# Patient Record
Sex: FEMALE | Race: ASIAN | NOT HISPANIC OR LATINO | Employment: FULL TIME | URBAN - METROPOLITAN AREA
[De-identification: names, ages, dates, MRNs, and addresses within clinical notes are randomized per-mention and may not be internally consistent; named-entity substitution may affect disease eponyms.]

---

## 2017-01-04 ENCOUNTER — TELEPHONE (OUTPATIENT)
Dept: ENDOCRINOLOGY | Facility: CLINIC | Age: 42
End: 2017-01-04

## 2017-01-04 DIAGNOSIS — E04.2 MULTINODULAR GOITER: Primary | ICD-10-CM

## 2017-01-04 NOTE — TELEPHONE ENCOUNTER
I called patient about FNA result. She is considering removing gland.  She went to [facility]. They also recommended biopsy.   Isthmus was benign but right mid nodule was inssuficient cells.   I recommended to repeat and she agrees.    Elvia Gomes MD  Staff Physician  Endocrinology and Metabolism  [facility]  License: [id number]  Pager: [phone]    Patient Name: ANTOINE HAN   MR#: [id number]   Specimen #: [id number]   Collected: 12/21/2016   Received: 12/22/2016   Reported: 12/23/2016 09:47   Ordering Phy(s): ELVIA GOMES     For improved result formatting, select 'View Enhanced Report Format'   under Linked Documents section.     SPECIMEN/STAIN PROCESS:   A: FNA-thyroid, right        Pap-Cyto x 3, Steiner's stain-cyto x 3   B: FNA-thyroid, isthmus        Pap-Cyto x 3, Steiner's stain-cyto x 3     ----------------------------------------------------------------     CYTOLOGIC INTERPRETATION:     A.  Thyroid, Right, Fine Needle Aspiration:   Unsatisfactory specimen.   Insufficient Follicular cells     [facility] Implied Risk of Malignancy and Recommended Clinical   Management:   Nondiagnostic or Unsatisfactory has a 1-4% risk of malignancy,   recommended management is repeat FNA with ultrasound guidance     Specimen Adequacy: Unsatisfactory for evaluation, due to:   ...scant follicular cells present.     B.  Thyroid, Isthmus, Fine Needle Aspiration:   Benign   Consistent with a benign nodule (includes adenomatoid nodule, colloid   nodule, etc.)     [facility] Implied Risk of Malignancy and Recommended Clinical   Management:   Benign has a 0-3% risk of malignancy, recommended management is clinical   follow-up     Specimen Adequacy: Satisfactory for evaluation.     I have personally reviewed all specimens and/or slides, including the   listed special stains, and used them with my medical judgment to   determine the final diagnosis.     Electronically signed out by:   Lencho Barclay M.D., [facility]      Processed and screened at [facility]     CLINICAL HISTORY:   The patient is a 41 year old female who presented for evaluation of   multiple thyroid nodules.     ,     GROSS:   A. FNA-thyroid, right:  Received are 3 fixed slides, processed for Pap   stain, and 3 air dried slides, processed for Steiner's stain. Material in   RPMI spun down, put in molecular panel tube and held for future studies.     B. FNA-thyroid, isthmus:  Received are 3 fixed slides, processed for Pap   stain, and 3 air dried slides, processed for Steiner's stain. Material in   RPMI spun down, put in molecular panel tube and held for future studies.     MICROSCOPIC:   Microscopic evaluation performed.     Yon Blount MD , Cytopathology Fellow, Justin Barclay MD.     CPT Codes:   A: 34475-TYKY, 61995-[id number]   B: 22917-UOBM, 25130-UTNKLET     TESTING LAB LOCATION:   [facility], [address]   [address] [phone]

## 2017-01-06 NOTE — TELEPHONE ENCOUNTER
Left message for patient to call procedural scheduling (number provided) for repeat FNA.     Encouraged to return clinic call directly with any questions.    Germania Tamayo LPN  Adult Endocrinology  [facility]

## 2017-01-27 NOTE — TELEPHONE ENCOUNTER
Patient states that she would prefer to wait until next year for next FNA. Writer offered to schedule follow up appointment, patient states that she will schedule at a later date.  Indy Miller LPN

## 2018-01-03 ENCOUNTER — OFFICE VISIT (OUTPATIENT)
Dept: PEDIATRICS | Facility: CLINIC | Age: 43
End: 2018-01-03
Payer: COMMERCIAL

## 2018-01-03 VITALS
HEIGHT: 63 IN | DIASTOLIC BLOOD PRESSURE: 68 MMHG | BODY MASS INDEX: 22.5 KG/M2 | WEIGHT: 127 LBS | OXYGEN SATURATION: 97 % | TEMPERATURE: 97.4 F | HEART RATE: 72 BPM | SYSTOLIC BLOOD PRESSURE: 98 MMHG

## 2018-01-03 DIAGNOSIS — Z13.220 LIPID SCREENING: ICD-10-CM

## 2018-01-03 DIAGNOSIS — Z00.00 ROUTINE HISTORY AND PHYSICAL EXAMINATION OF ADULT: Primary | ICD-10-CM

## 2018-01-03 DIAGNOSIS — R73.03 PREDIABETES: ICD-10-CM

## 2018-01-03 DIAGNOSIS — E04.2 MULTINODULAR GOITER: ICD-10-CM

## 2018-01-03 PROCEDURE — 99396 PREV VISIT EST AGE 40-64: CPT | Performed by: INTERNAL MEDICINE

## 2018-01-03 PROCEDURE — 99213 OFFICE O/P EST LOW 20 MIN: CPT | Mod: 25 | Performed by: INTERNAL MEDICINE

## 2018-01-03 ASSESSMENT — ANXIETY QUESTIONNAIRES
1. FEELING NERVOUS, ANXIOUS, OR ON EDGE: NOT AT ALL
2. NOT BEING ABLE TO STOP OR CONTROL WORRYING: NOT AT ALL
7. FEELING AFRAID AS IF SOMETHING AWFUL MIGHT HAPPEN: NOT AT ALL
6. BECOMING EASILY ANNOYED OR IRRITABLE: SEVERAL DAYS
GAD7 TOTAL SCORE: 1
5. BEING SO RESTLESS THAT IT IS HARD TO SIT STILL: NOT AT ALL
3. WORRYING TOO MUCH ABOUT DIFFERENT THINGS: NOT AT ALL

## 2018-01-03 ASSESSMENT — PATIENT HEALTH QUESTIONNAIRE - PHQ9
5. POOR APPETITE OR OVEREATING: NOT AT ALL
SUM OF ALL RESPONSES TO PHQ QUESTIONS 1-9: 2

## 2018-01-03 NOTE — PATIENT INSTRUCTIONS
Make appointment(s) for:   -- fasting lab appointment.  -- thyroid ultrasound                Preventive Health Recommendations  Female Ages 40 to 49    Yearly exam:     See your health care provider every year in order to  1. Review health changes.   2. Discuss preventive care.    3. Review your medicines if your doctor prescribed any.      Get a Pap test every three years (unless you have an abnormal result and your provider advises testing more often).      If you get Pap tests with HPV test, you only need to test every 5 years, unless you have an abnormal result. You do not need a Pap test if your uterus was removed (hysterectomy) and you have not had cancer.      You should be tested each year for STDs (sexually transmitted diseases), if you're at risk.       Ask your doctor if you should have a mammogram.      Have a colonoscopy (test for colon cancer) if someone in your family has had colon cancer or polyps before age 50.       Have a cholesterol test every 5 years.       Have a diabetes test (fasting glucose) after age 45. If you are at risk for diabetes, you should have this test every 3 years.    Shots: Get a flu shot each year. Get a tetanus shot every 10 years.     Nutrition:     Eat at least 5 servings of fruits and vegetables each day.    Eat whole-grain bread, whole-wheat pasta and brown rice instead of white grains and rice.    Talk to your provider about Calcium and Vitamin D.     Lifestyle    Exercise at least 150 minutes a week (an average of 30 minutes a day, 5 days a week). This will help you control your weight and prevent disease.    Limit alcohol to one drink per day.    No smoking.     Wear sunscreen to prevent skin cancer.    See your dentist every six months for an exam and cleaning.

## 2018-01-03 NOTE — NURSING NOTE
"Chief Complaint   Patient presents with     Physical       Initial BP 98/68  Pulse 72  Temp 97.4  F (36.3  C) (Temporal)  Ht 5' 3\" (1.6 m)  Wt 127 lb (57.6 kg)  LMP 12/02/2017  SpO2 97%  BMI 22.5 kg/m2 Estimated body mass index is 22.5 kg/(m^2) as calculated from the following:    Height as of this encounter: 5' 3\" (1.6 m).    Weight as of this encounter: 127 lb (57.6 kg).  Medication Reconciliation: complete    "

## 2018-01-03 NOTE — MR AVS SNAPSHOT
After Visit Summary   1/3/2018    Tete Cage    MRN: [id number]           Patient Information     Date Of Birth          1975        Visit Information        Provider Department      1/3/2018 11:50 AM Kannan De Jesus MD PhD [facility]        Today's Diagnoses     Routine history and physical examination of adult    -  1    Multinodular goiter        Lipid screening        Prediabetes          Care Instructions    Make appointment(s) for:   -- fasting lab appointment.  -- thyroid ultrasound                Preventive Health Recommendations  Female Ages 40 to 49    Yearly exam:     See your health care provider every year in order to  1. Review health changes.   2. Discuss preventive care.    3. Review your medicines if your doctor prescribed any.      Get a Pap test every three years (unless you have an abnormal result and your provider advises testing more often).      If you get Pap tests with HPV test, you only need to test every 5 years, unless you have an abnormal result. You do not need a Pap test if your uterus was removed (hysterectomy) and you have not had cancer.      You should be tested each year for STDs (sexually transmitted diseases), if you're at risk.       Ask your doctor if you should have a mammogram.      Have a colonoscopy (test for colon cancer) if someone in your family has had colon cancer or polyps before age 50.       Have a cholesterol test every 5 years.       Have a diabetes test (fasting glucose) after age 45. If you are at risk for diabetes, you should have this test every 3 years.    Shots: Get a flu shot each year. Get a tetanus shot every 10 years.     Nutrition:     Eat at least 5 servings of fruits and vegetables each day.    Eat whole-grain bread, whole-wheat pasta and brown rice instead of white grains and rice.    Talk to your provider about Calcium and Vitamin D.     Lifestyle    Exercise at least 150 minutes a week (an average of 30 minutes a day, 5  days a week). This will help you control your weight and prevent disease.    Limit alcohol to one drink per day.    No smoking.     Wear sunscreen to prevent skin cancer.    See your dentist every six months for an exam and cleaning.          Follow-ups after your visit        Future tests that were ordered for you today     Open Future Orders        Priority Expected Expires Ordered    TSH Routine  1/31/2018 1/3/2018    Glucose Routine  1/31/2018 1/3/2018    Lipid panel reflex to direct LDL Fasting Routine  1/31/2018 1/3/2018    US Thyroid Routine  1/3/2019 1/3/2018            Who to contact     If you have questions or need follow up information about today's clinic visit or your schedule please contact [facility] directly at [phone].  Normal or non-critical lab and imaging results will be communicated to you by [facility] [employer] [facility], letter or phone within 4 business days after the clinic has received the results. If you do not hear from us within 7 days, please contact the clinic through [facility] [employer] or phone. If you have a critical or abnormal lab result, we will notify you by phone as soon as possible.  Submit refill requests through [employer] or call your pharmacy and they will forward the refill request to us. Please allow 3 business days for your refill to be completed.          Additional Information About Your Visit        [facility] [employer] Information     [employer] gives you secure access to your electronic health record. If you see a primary care provider, you can also send messages to your care team and make appointments. If you have questions, please call your primary care clinic.  If you do not have a primary care provider, please call [phone] and they will assist you.      [employer] is an electronic gateway that provides easy, online access to your medical records. With [employer], you can request a clinic appointment, read your test results, renew a prescription or communicate with your care team.     To  "access your existing account, please contact your [facility] or call [phone] for assistance.        Care EveryWhere ID     This is your Care EveryWhere ID. This could be used by other organizations to access your [place] medical records  [id number]        Your Vitals Were     Pulse Temperature Height Last Period Pulse Oximetry BMI (Body Mass Index)    72 97.4  F (36.3  C) (Temporal) 5' 3\" (1.6 m) 12/02/2017 97% 22.5 kg/m2       Blood Pressure from Last 3 Encounters:   01/03/18 98/68   12/22/16 (!) 88/56   12/14/16 107/68    Weight from Last 3 Encounters:   01/03/18 127 lb (57.6 kg)   12/22/16 134 lb (60.8 kg)   12/14/16 135 lb 8 oz (61.5 kg)               Primary Care Provider Office Phone # Fax #    Kannan De Jesus MD [facility] [phone]       [address] [facility] [address]        Equal Access to Services     [facility]: Hadii aad ku hadasho Soomaali, waaxda luqadaha, qaybta kaalmada adeegyada, waxay ric haymingo palmer . So [facility] [phone].    ATENCIÓN: Si habla español, tiene a musa disposición servicios gratuitos de asistencia lingüística. Llame al [phone].    We comply with applicable federal civil rights laws and [place] laws. We do not discriminate on the basis of race, color, national origin, age, disability, sex, sexual orientation, or gender identity.            Thank you!     Thank you for choosing [facility]  for your care. Our goal is always to provide you with excellent care. Hearing back from our patients is one way we can continue to improve our services. Please take a few minutes to complete the written survey that you may receive in the mail after your visit with us. Thank you!             Your Updated Medication List - Protect others around you: Learn how to safely use, store and throw away your medicines at www.disposemymeds.org.          This list is accurate as of: 1/3/18 12:53 PM.  Always use your " most recent med list.                   Brand Name Dispense Instructions for use Diagnosis    clindamycin-benzoyl Per (Refr) 1.2-5 % Gel    DUAC    45 g    Apply to face, particularly over jaw line, daily    Acne vulgaris       MULTIVITAMIN PO      Take one tablet daily    Multinodular goiter, History of gestational diabetes       vitamin D 2000 UNITS Caps      Take 4,000 Units by mouth daily as needed

## 2018-01-03 NOTE — PROGRESS NOTES
SUBJECTIVE:   CC: Tete Cage is an 42 year old woman who presents for preventive health visit.     Healthy Habits:    Do you get at least three servings of calcium containing foods daily (dairy, green leafy vegetables, etc.)? yes    Amount of exercise or daily activities, outside of work: 1 day(s) per week    Problems taking medications regularly No    Medication side effects: No    Have you had an eye exam in the past two years? yes    Do you see a dentist twice per year? yes    Do you have sleep apnea, excessive snoring or daytime drowsiness?no  =================  History of multinodular goiter. Had biopsy a year ago, benign. She is wondering what she needs to do next. Previously followed by endocrine but has not follow up since the biopsy. She feel the goiter may be getting larger. Has a discomfort on the left side.     -------------------------------------    Today's PHQ-2 Score: PHQ-2 ( 1999 Pfizer) 9/20/2012 12/16/2011   Q1: Little interest or pleasure in doing things 0 0   Q2: Feeling down, depressed or hopeless 1 0   PHQ-2 Score 1 0       Abuse: Current or Past(Physical, Sexual or Emotional)- No  Do you feel safe in your environment - Yes    Social History   Substance Use Topics     Smoking status: Never Smoker     Smokeless tobacco: Never Used     Alcohol use No     If you drink alcohol do you typically have >3 drinks per day or >7 drinks per week? No                     Reviewed orders with patient.  Reviewed health maintenance and updated orders accordingly - Yes  Labs reviewed in EPIC    Patient under age 50, mutual decision reflected in health maintenance.        Pertinent mammograms are reviewed under the imaging tab.  History of abnormal Pap smear: NO - age 30- 65 PAP every 3 years recommended    Reviewed and updated as needed this visit by clinical staffTobacco  Allergies  Meds  Med Hx  Surg Hx  Fam Hx  Soc Hx        Reviewed and updated as needed this visit by Provider              NOHEMY:  C:  "NEGATIVE for fever, chills, change in weight  I: NEGATIVE for worrisome rashes, moles or lesions  E: NEGATIVE for vision changes or irritation  ENT: NEGATIVE for ear, mouth and throat problems  R: NEGATIVE for significant cough or SOB  B: NEGATIVE for masses, tenderness or discharge  CV: NEGATIVE for chest pain, palpitations or peripheral edema  GI: NEGATIVE for nausea, abdominal pain, heartburn, or change in bowel habits  : NEGATIVE for unusual urinary or vaginal symptoms. Periods are regular.  M: NEGATIVE for significant arthralgias or myalgia  N: NEGATIVE for weakness, dizziness or paresthesias  P: NEGATIVE for changes in mood or affect    OBJECTIVE:   BP 98/68  Pulse 72  Temp 97.4  F (36.3  C) (Temporal)  Ht 5' 3\" (1.6 m)  Wt 127 lb (57.6 kg)  LMP 12/02/2017  SpO2 97%  BMI 22.5 kg/m2  EXAM:  GENERAL: healthy, alert and no distress  EYES: Eyes grossly normal to inspection, PERRL and conjunctivae and sclerae normal  HENT: ear canals and TM's normal, nose and mouth without ulcers or lesions  NECK: no adenopathy, no asymmetry, masses, or scars, Positive for  Enlarged thyroid gland, mildly tender to palpation on the left lobe.   RESP: lungs clear to auscultation - no rales, rhonchi or wheezes  BREAST: normal without masses, tenderness or nipple discharge and no palpable axillary masses or adenopathy  CV: regular rate and rhythm, normal S1 S2, no S3 or S4, no murmur, click or rub, no peripheral edema and peripheral pulses strong  ABDOMEN: soft, nontender, no hepatosplenomegaly, no masses and bowel sounds normal  MS: no gross musculoskeletal defects noted, no edema  SKIN: no suspicious lesions or rashes  NEURO: Normal strength and tone, mentation intact and speech normal  PSYCH: mentation appears normal, affect normal/bright    ASSESSMENT/PLAN:       ICD-10-CM    1. Routine history and physical examination of adult Z00.00    2. Multinodular goiter E04.2 US Thyroid     Lipid panel reflex to direct LDL Fasting " "  3. Lipid screening Z13.220 TSH   4. Prediabetes R73.03 Glucose     -- will repeat US check TSH. From the prior report, she has multiple goiters and has growth and new nodules. She may be a candidate for surgical resection as a treatment. This was mentioned to her in the past but she has not followed through. But she seems ready now if the nodules are growing.     COUNSELING:   Reviewed preventive health counseling, as reflected in patient instructions         reports that she has never smoked. She has never used smokeless tobacco.    Estimated body mass index is 22.5 kg/(m^2) as calculated from the following:    Height as of this encounter: 5' 3\" (1.6 m).    Weight as of this encounter: 127 lb (57.6 kg).         Counseling Resources:  ATP IV Guidelines  Pooled Cohorts Equation Calculator  Breast Cancer Risk Calculator  FRAX Risk Assessment  ICSI Preventive Guidelines  Dietary Guidelines for Americans, 2010  USDA's MyPlate  ASA Prophylaxis  Lung CA Screening    Kannan De Jesus MD PhD  [facility]  "

## 2018-01-04 DIAGNOSIS — E04.2 MULTINODULAR GOITER: ICD-10-CM

## 2018-01-04 DIAGNOSIS — R73.03 PREDIABETES: ICD-10-CM

## 2018-01-04 DIAGNOSIS — Z13.220 LIPID SCREENING: ICD-10-CM

## 2018-01-04 LAB
CHOLEST SERPL-MCNC: 179 MG/DL
GLUCOSE SERPL-MCNC: 90 MG/DL (ref 70–99)
HDLC SERPL-MCNC: 71 MG/DL
LDLC SERPL CALC-MCNC: 97 MG/DL
NONHDLC SERPL-MCNC: 108 MG/DL
TRIGL SERPL-MCNC: 55 MG/DL
TSH SERPL DL<=0.005 MIU/L-ACNC: 0.59 MU/L (ref 0.4–4)

## 2018-01-04 PROCEDURE — 36415 COLL VENOUS BLD VENIPUNCTURE: CPT | Performed by: INTERNAL MEDICINE

## 2018-01-04 PROCEDURE — 80061 LIPID PANEL: CPT | Performed by: INTERNAL MEDICINE

## 2018-01-04 PROCEDURE — 84443 ASSAY THYROID STIM HORMONE: CPT | Performed by: INTERNAL MEDICINE

## 2018-01-04 PROCEDURE — 82947 ASSAY GLUCOSE BLOOD QUANT: CPT | Performed by: INTERNAL MEDICINE

## 2018-01-04 ASSESSMENT — ANXIETY QUESTIONNAIRES: GAD7 TOTAL SCORE: 1

## 2018-01-04 NOTE — PROGRESS NOTES
Dear Tete,   Here are your recent results which are within the expected range.  Please continue with your current plan of care.     Please call or Mychart to our office if you have further questions.     Kannan De Jesus MD-PhD

## 2018-01-08 ENCOUNTER — RADIANT APPOINTMENT (OUTPATIENT)
Dept: ULTRASOUND IMAGING | Facility: CLINIC | Age: 43
End: 2018-01-08
Attending: INTERNAL MEDICINE
Payer: COMMERCIAL

## 2018-01-08 DIAGNOSIS — E04.2 MULTINODULAR GOITER: ICD-10-CM

## 2018-01-08 PROCEDURE — 76536 US EXAM OF HEAD AND NECK: CPT | Performed by: RADIOLOGY

## 2018-01-21 NOTE — PROGRESS NOTES
Dear Tete,   Here are your recent results.   -- the nodules are not significantly changed by the thyroid gland itself is getting larger (both the right and the left lobes).   -- if you'd like to get a surgical opinion regarding removing the thyroid gland, please let me know.   -- alternative would be discuss with your endocrinologist about taking thyroid mediation to suppress growth.     Please call or Mychart to our office if you have further questions.     Kannan De Jesus MD-PhD

## 2018-11-26 ENCOUNTER — OFFICE VISIT (OUTPATIENT)
Dept: ENDOCRINOLOGY | Facility: CLINIC | Age: 43
End: 2018-11-26
Payer: COMMERCIAL

## 2018-11-26 VITALS
HEART RATE: 80 BPM | SYSTOLIC BLOOD PRESSURE: 105 MMHG | HEIGHT: 63 IN | DIASTOLIC BLOOD PRESSURE: 67 MMHG | BODY MASS INDEX: 22.95 KG/M2 | WEIGHT: 129.5 LBS

## 2018-11-26 DIAGNOSIS — E04.2 MULTINODULAR GOITER: ICD-10-CM

## 2018-11-26 DIAGNOSIS — Z86.32 HISTORY OF GESTATIONAL DIABETES: Primary | ICD-10-CM

## 2018-11-26 ASSESSMENT — PAIN SCALES - GENERAL: PAINLEVEL: NO PAIN (0)

## 2018-11-26 NOTE — PROGRESS NOTES
Endocrinology Consult Note    Attending ASSESSMENT/PLAN:   1. Goiter with multiple nodules in young woman originally from [place].   Recommend CT neck and chest - no contrast  Labs to include TSH    Addendum: CT images show goiter without substernal extension or airway compression. 2 mm lung nodule.     History of Gestational Diabetes mellitus- labs to include HgbA1c  Addendum: results show pre-diabetes     Lucia Landin MD      Cc/ HISTORY OF PRESENT ILLNESS  43 yr old woman presents for follow up of goiter and history of gestational hyperthyroxinemia and GDM. I have seen her in the past, last in 2014.  She saw Dr Gomes 12/16.  Following that appt she had US and also FNAB of 2 thyroid nodules.  I have reviewed all the images and reports from 2016 as well as US images from 1/18.      Goiter was described as 3-4 times in 2008.  I lst met her in 2012 . At that time I described the thyroid as 3-4 times normal.  In 2013 I noted she had recurrent gestational hyperthyroxinemia followed by hypothyroxinemia in the 3rd trimester.  She also had GDM.      1/8/18 (compared with 12/13/16 and 1/13/14 thyroid US:  Right lobe 3.8 x 3.1 x ?  (was 2.9 x 2.1 x ?  2008_  Left lobe 4.1 x 2.8 x ?  (was 2.6 x 2.1 x ? 2008)  Isthmus 1 cm (was 0.6 cm 2008)  Right # 1  Posterior 1.7 x 1.2 x 2.5  - at least 50% centrally cystic (was 1.7 x 1.3 x 2.3 2016;  1.9 x 1.1 x  2.2 ; 1.7 x 1.1 x  2.6 2016; 1.6 x 1.1 x 2.1 1/14  FNAB 12/16 insufficient  Right # 2 posterior medial 1.2 x 1.2 x 1.2 isoechoic (was 1.2 x 1.1 x 1.2   Right # 3 inf 0.7 x 0.6 x 0.7  (was 0.7 x 0.4 x   Isthmus cyst 1.6 x 0.9 x 2.2 cm FNAB 12/21/16 (was 1.7 x 0.8 x 2 cm 2016;  1.3 x 0.8 x 1.2 1/14) FNAB 2016 benign  Left # 4 0.5 x 0.5 x 0.4 cm   Left # 5  0.9 x 0.5 x 1 cm   Left # 6  1.8 x 1.1 x 1.9 mixed   Left # 7  0.8 x 0.9 x 0.9 cm       REVIEW OF SYSTEMS  Feels pain  Left thyroid bed region -   If she turns her head to the left she feels it more . This has been > 1  "year  She feels it a little with head extension.    Voice is OK  Swallow is OK   Cardiac: negative  Respiratory: negative  GI: negative    Past Medical History  Past Medical History:   Diagnosis Date     Acne      Arthritis     pt reports dx as child but not current     GDM (gestational diabetes mellitus) 2013     Goiter 2008     Kidney disease     pt reports as child but not current     Papanicolaou smear of cervix with low grade squamous intraepithelial lesion (LGSIL)     2 years ago in [place], has one normal PAP since     Thyroid nodules     reports 3 nodules detected in 2008, declined surgery     Gestational hyperthyroxinemia  G3, T1, P0, A1, L1    Medications  Current Outpatient Prescriptions   Medication Sig Dispense Refill     Cholecalciferol (VITAMIN D) 2000 UNITS CAPS Take 4,000 Units by mouth daily as needed       Multiple Vitamins-Minerals (MULTIVITAMIN OR) Take one tablet daily       clindamycin-benzoyl Per, Refr, (DUAC) 1.2-5 % GEL Apply to face, particularly over jaw line, daily (Patient not taking: Reported on 11/26/2018) 45 g 11       Allergies  Allergies   Allergen Reactions     Nkda [No Known Drug Allergies]        Family History  Family History   Problem Relation Age of Onset     Heart Disease Father      MI     Diabetes Father      Heart Disease Mother      MI     Thyroid Disease Mother      transient     Depression Maternal Grandfather      Cancer No family hx of      no skin cancer     No gestational thyroid problems in mother or sisters    Social History  Social History   Substance Use Topics     Smoking status: Never Smoker     Smokeless tobacco: Never Used     Alcohol use No       Physical Exam  /67  Pulse 80  Ht 1.6 m (5' 3\")  Wt 58.7 kg (129 lb 8 oz)  BMI 22.94 kg/m2  Body mass index is 22.94 kg/(m^2).  GENERAL :   young woman In no apparent distress.  SKIN: Normal color, temperature  EYES: PER, No scleral icterus,  No proptosis, conjunctival redness, stare, retraction  NECK: " visible goiter , overall size approx 4 times.  Left appears slightly largre than right.  I have performed real time neck and thyrodi US.  There are no abnormal cervical LNs.  I was unable to definitively get below the left inferior border of the thyroid - I can't exclude substernal extension.  LUNGS: clear bilaterally  CARDIAC: RRR S1S2  ABDOMEN: rounded contour   NEURO: awake, alert, responds appropriately to questions.  Moves all extremities; Gait normal.   No tremor      DATA REVIEW    Copath Report 12/21/2016  1:09 PM 88      Patient Name: ANTOINE HAN   MR#: [id number]   Specimen #: [id number]   Collected: 12/21/2016   Received: 12/22/2016   Reported: 12/23/2016 09:47   Ordering Phy(s): ELVIA THIBODEAUX     For improved result formatting, select 'View Enhanced Report Format'   under Linked Documents section.     SPECIMEN/STAIN PROCESS:   A: FNA-thyroid, right        Pap-Cyto x 3, Steiner's stain-cyto x 3   B: FNA-thyroid, isthmus        Pap-Cyto x 3, Steiner's stain-cyto x 3     ----------------------------------------------------------------     CYTOLOGIC INTERPRETATION:     A.  Thyroid, Right, Fine Needle Aspiration:   Unsatisfactory specimen.   Insufficient Follicular cells     The [place] Implied Risk of Malignancy and Recommended Clinical   Management:   Nondiagnostic or Unsatisfactory has a 1-4% risk of malignancy,   recommended management is repeat FNA with ultrasound guidance     Specimen Adequacy: Unsatisfactory for evaluation, due to:   ...scant follicular cells present.     B.  Thyroid, Isthmus, Fine Needle Aspiration:   Benign   Consistent with a benign nodule (includes adenomatoid nodule, colloid   nodule, etc.)     The [place] Implied Risk of Malignancy and Recommended Clinical   Management:   Benign has a 0-3% risk of malignancy, recommended management is clinical   follow-up     Specimen Adequacy: Satisfactory for evaluation.     I have personally reviewed all specimens and/or slides, including the    listed special stains, and used them with my medical judgment to   determine the final diagnosis.     Electronically signed out by:   Lencho Barclay M.D., [facility]     Processed and screened at [facility]     CLINICAL HISTORY:   The patient is a 41 year old female who presented for evaluation of   multiple thyroid nodules.     ,     GROSS:   A. FNA-thyroid, right:  Received are 3 fixed slides, processed for Pap   stain, and 3 air dried slides, processed for Steiner's stain. Material in   RPMI spun down, put in molecular panel tube and held for future studies.     B. FNA-thyroid, isthmus:  Received are 3 fixed slides, processed for Pap   stain, and 3 air dried slides, processed for Steiner's stain. Material in   RPMI spun down, put in molecular panel tube and held for future studies.     MICROSCOPIC:   Microscopic evaluation performed.     Yon Blount MD , Cytopathology Fellow, Justin Barclay MD.     CPT Codes:   A: 05624-LKED, 64777-BTCVSGD   B: 21488-ICJF, 86460-AAQLNZY     TESTING LAB LOCATION:   [facility], [address]   [address]   [phone]     COLLECTION SITE:   Client:  [facility]   Location:  [facility] (B)        EXAMINATION: US THYROID, 1/8/2018 11:39 AM      COMPARISON: 12/21/2016, 12/30/2016, 10/16/2012     HISTORY: Multinodular goiter. Thyroid nodule biopsy of a nodule in  isthmus nodule on 12/21/2016 yielded benign results.     Technique: Grayscale and color ultrasound imaging of the thyroid was  performed.     Findings:    Thyroid parenchyma: Heterogeneous  The right lobe of the thyroid measures: 8.5 x 3.1 x 3.8 cm, previously  6.5 x 2.9 x 3.3 cm   The thyroid isthmus measures: 1 cm, previously 0.7 cm   The left lobe of the thyroid measures: 7.5 x 2.8 x 4.1 cm, previously  6.7 x 3 x 3.4 cm      Right  lobe:  Nodule 1: Previously biopsied.  Nodule measurement: 2.5 x 1.7 x 1.2 cm , previously 2.6 x 1.7 x 1.1 cm  Echogenicity: Isoechoic  Consistency: Cystic and solid  Calcifications: no  Hypervascular: no  Interval growth (>20%): no     Nodule 2:  Nodule measurement: 1.2 x 1.2 x 1.2 cm , previously 1.2 x 1.2 x 1.1 cm  Echogenicity: Isoechoic  Consistency: solid  Calcifications: no  Hypervascular: no  Interval growth (>20%): no     Nodule 3:  Nodule measurement: 0.7 x 0.7 x 0.6 cm , previously 0.8 x 0.7 x 0.4 cm  Echogenicity: Isoechoic  Consistency: solid  Calcifications: no  Hypervascular: no  Interval growth (>20%): no     Isthmus:  Nodule 8: Previously biopsied  Nodule measurement: 2.2 x 1.6 x 0.9 cm , previously 2 x 1.3 x 0.7 cm  Echogenicity: Anechoic  Consistency: cystic  Calcifications: no  Hypervascular: no  Interval growth (>20%): no     Left Lobe:   Nodule 4:  Nodule measurement: 0.4 x 0.5 x 0.5 cm , previously 0.7 x 0.7 x 0.5 cm  Echogenicity: Isoechoic  Consistency: Cystic and solid  Calcifications: no  Hypervascular: no  Interval growth (>20%): no     Nodule 5:  Nodule measurement: 1 x 0.9 x 0.5 cm , previously 1 x 0.9 x 0.4 cm  Echogenicity: Isoechoic  Consistency: solid  Calcifications: no  Hypervascular: no  Interval growth (>20%): no     Nodule 6:  Nodule measurement: 1.9 x 1.8 x 1.1 cm , previously 1.8 x 1.7 x 1 cm  Echogenicity: Isoechoic  Consistency: Cystic and solid  Calcifications: no  Hypervascular: no  Interval growth (>20%): no     Nodule 7:  Nodule measurement: 0.9 x 0.8 x 0.9 cm , previously 0.9 x 0.9 x 0.8 cm  Echogenicity: Hypoechoic  Consistency: solid  Calcifications: no  Hypervascular: no  Interval growth (>20%): no         Impression:  Heterogeneous, enlarging thyroid glands. Multinodular nodules, less  defined as before.     JOCELYN PEPE MD    Results for ANTOINE HAN (MRN [id number]) as of 1/10/2019 20:14   Ref. Range 1/10/2019 11:46   Hemoglobin A1C Latest Ref Range: 0 - 5.6 % 5.8 (H)    TSH Latest Ref Range: 0.40 - 4.00 mU/L 0.76     EXAMINATION: CT CHEST W/O CONTRAST, 1/10/2019 11:54 AM     TECHNIQUE:  Helical CT images from the thoracic inlet through the  upper abdomen were obtained without intravenous contrast.  Images are  displayed at 1 and 5 mm intervals. Images reviewed in lung, soft  tissue, and bone windows.     Radiation Dose (DLP): 342 mGy*cm     COMPARISON: None     HISTORY: Multinodular goiter; History of gestational diabetes     FINDINGS:  Enlarged, lobular thyroid. In maximum AP diameter, left lobe measures  up to 5.1 cm and right lobe measures up to 5.1 cm.     The central tracheobronchial tree is patent. Minimal bibasilar,  dependent, subpleural reticular/ground glass attenuation, consistent  with atelectasis. Biapical scarring. No focal consolidation. No  pneumothorax or pleural effusion. Left upper lobe solid pulmonary  nodule measuring 2 mm (series 5 image 56).     The heart size is normal. No pericardial effusion. Normal caliber and  configuration of the thoracic great vessels. No thoracic adenopathy.     Limited views of the abdomen reveal no acute pathology. No suspicious  bony or soft tissue lesions.                                                                      IMPRESSION:      1. Enlarged, lobular thyroid gland, better characterized on comparison  CT neck. Please see same-day report for additional description  details.  2. 2 mm solid pulmonary nodule in the left upper lobe, statistically  benign. No additional follow up indicated at this time.     I have personally reviewed the examination and initial interpretation  and I agree with the findings.     JESSICA JONES MD    CT SOFT TISSUE NECK W/O CONTRAST 1/10/2019 11:53 AM     History:  evalute for substernal goiter.  NO CONTRAST PLEASE.;  Multinodular goiter; History of gestational diabetes  ICD-10: Multinodular goiter; History of gestational diabetes      Comparison:  Thyroid ultrasound 1 2018      Technique:  Thin section helical CT images were obtained from the skull  base down to the level of the aortic arch.  Axial, coronal and  sagittal reformations were performed with 2-3 mm slice thickness  reconstruction. Images were reviewed in soft tissue, lung and bone  windows.     Findings:   Evaluation of the mucosal space demonstrates no evident abnormality in  the nasopharynx, oropharynx, hypopharynx or the glottis. The tongue  base appears normal. The major salivary glands appear unremarkable.  The thyroid gland appears moderately enlarged overall and relatively  symmetric and bilateral although the right lobe that is also enlarged  inferiorly has further extension into the superior mediastinum with  only minimal airway compression. The bilaterally moderately enlarged  thyroid gland overall somewhat lumpy and nodular with hypoattenuating  areas more inferiorly on both sides, right greater than left. No overt  calcifications.     There is no evident cervical lymphadenopathy, where there are tiny,  subcentimeter nodes in levels 2 bilaterally that are not enlarged by  size criteria. The fascial spaces in the neck are intact bilaterally.  The major vascular structures in the neck appear unremarkable.     Evaluation of the osseous structures demonstrate no worrisome lytic or  sclerotic lesion. No overt spinal canal or neuroforaminal stenosis.  The visualized paranasal sinuses are clear. The mastoid air cells are  clear.      The visualized lung apices are clear.                                                                      Impression: Moderately enlarged multinodular thyroid goiter,  relatively symmetric, with very minimal tracheal airway narrowing.     I have personally reviewed the examination and initial interpretation  and I agree with the findings.     ARGENIS CAMP MD

## 2018-11-26 NOTE — LETTER
11/26/2018       RE: Tete Cage  [address]  [facility] [address]     Dear Colleague,    Thank you for referring your patient, Tete Cage, to the [facility] ENDOCRINOLOGY at [facility]. Please see a copy of my visit note below.    Endocrinology Consult Note    Attending ASSESSMENT/PLAN:   1. Goiter with multiple nodules in young woman originally from [place].   Recommend CT neck and chest - no contrast  Labs to include TSH    History of Gestational Diabetes mellitus- labs to include HgbA1c    Lucia Landin MD      Cc/ HISTORY OF PRESENT ILLNESS  43 yr old woman presents for follow up of goiter and history of gestational hyperthyroxinemia and GDM. I have seen her in the past, last in 2014.  She saw Dr Gomes 12/16.  Following that appt she had US and also FNAB of 2 thyroid nodules.  I have reviewed all the images and reports from 2016 as well as US images from 1/18.      Goiter was described as 3-4 times in 2008.  I lst met her in 2012 . At that time I described the thyroid as 3-4 times normal.  In 2013 I noted she had recurrent gestational hyperthyroxinemia followed by hypothyroxinemia in the 3rd trimester.  She also had GDM.      1/8/18 (compared with 12/13/16 and 1/13/14 thyroid US:  Right lobe 3.8 x 3.1 x ?  (was 2.9 x 2.1 x ?  2008_  Left lobe 4.1 x 2.8 x ?  (was 2.6 x 2.1 x ? 2008)  Isthmus 1 cm (was 0.6 cm 2008)  Right # 1  Posterior 1.7 x 1.2 x 2.5  - at least 50% centrally cystic (was 1.7 x 1.3 x 2.3 2016;  1.9 x 1.1 x  2.2 ; 1.7 x 1.1 x  2.6 2016; 1.6 x 1.1 x 2.1 1/14  FNAB 12/16 insufficient  Right # 2 posterior medial 1.2 x 1.2 x 1.2 isoechoic (was 1.2 x 1.1 x 1.2   Right # 3 inf 0.7 x 0.6 x 0.7  (was 0.7 x 0.4 x   Isthmus cyst 1.6 x 0.9 x 2.2 cm FNAB 12/21/16 (was 1.7 x 0.8 x 2 cm 2016;  1.3 x 0.8 x 1.2 1/14) FNAB 2016 benign  Left # 4 0.5 x 0.5 x 0.4 cm   Left # 5  0.9 x 0.5 x 1 cm   Left # 6  1.8 x 1.1 x 1.9 mixed   Left # 7  0.8 x 0.9 x 0.9 cm       REVIEW OF  "SYSTEMS  Feels pain  Left thyroid bed region -   If she turns her head to the left she feels it more . This has been > 1 year  She feels it a little with head extension.    Voice is OK  Swallow is OK   Cardiac: negative  Respiratory: negative  GI: negative    Past Medical History  Past Medical History:   Diagnosis Date     Acne      Arthritis     pt reports dx as child but not current     GDM (gestational diabetes mellitus) 2013     Goiter 2008     Kidney disease     pt reports as child but not current     Papanicolaou smear of cervix with low grade squamous intraepithelial lesion (LGSIL)     2 years ago in [place], has one normal PAP since     Thyroid nodules     reports 3 nodules detected in 2008, declined surgery     Gestational hyperthyroxinemia  G3, T1, P0, A1, L1    Medications  Current Outpatient Prescriptions   Medication Sig Dispense Refill     Cholecalciferol (VITAMIN D) 2000 UNITS CAPS Take 4,000 Units by mouth daily as needed       Multiple Vitamins-Minerals (MULTIVITAMIN OR) Take one tablet daily       clindamycin-benzoyl Per, Refr, (DUAC) 1.2-5 % GEL Apply to face, particularly over jaw line, daily (Patient not taking: Reported on 11/26/2018) 45 g 11       Allergies  Allergies   Allergen Reactions     Nkda [No Known Drug Allergies]        Family History  Family History   Problem Relation Age of Onset     Heart Disease Father      MI     Diabetes Father      Heart Disease Mother      MI     Thyroid Disease Mother      transient     Depression Maternal Grandfather      Cancer No family hx of      no skin cancer     No gestational thyroid problems in mother or sisters    Social History  Social History   Substance Use Topics     Smoking status: Never Smoker     Smokeless tobacco: Never Used     Alcohol use No       Physical Exam  /67  Pulse 80  Ht 1.6 m (5' 3\")  Wt 58.7 kg (129 lb 8 oz)  BMI 22.94 kg/m2  Body mass index is 22.94 kg/(m^2).  GENERAL :   young woman In no apparent distress.  SKIN: " Normal color, temperature  EYES: PER, No scleral icterus,  No proptosis, conjunctival redness, stare, retraction  NECK: visible goiter , overall size approx 4 times.  Left appears slightly largre than right.  I have performed real time neck and thyrodi US.  There are no abnormal cervical LNs.  I was unable to definitively get below the left inferior border of the thyroid - I can't exclude substernal extension.  LUNGS: clear bilaterally  CARDIAC: RRR S1S2  ABDOMEN: rounded contour   NEURO: awake, alert, responds appropriately to questions.  Moves all extremities; Gait normal.   No tremor      DATA REVIEW    Copath Report 12/21/2016  1:09 PM 88      Patient Name: ANTOINE HAN   MR#: [id number]   Specimen #: [id number]   Collected: 12/21/2016   Received: 12/22/2016   Reported: 12/23/2016 09:47   Ordering Phy(s): ELVIA THIBODEAUX     For improved result formatting, select 'View Enhanced Report Format'   under Linked Documents section.     SPECIMEN/STAIN PROCESS:   A: FNA-thyroid, right        Pap-Cyto x 3, Steiner's stain-cyto x 3   B: FNA-thyroid, isthmus        Pap-Cyto x 3, Steiner's stain-cyto x 3     ----------------------------------------------------------------     CYTOLOGIC INTERPRETATION:     A.  Thyroid, Right, Fine Needle Aspiration:   Unsatisfactory specimen.   Insufficient Follicular cells     The [place] Implied Risk of Malignancy and Recommended Clinical   Management:   Nondiagnostic or Unsatisfactory has a 1-4% risk of malignancy,   recommended management is repeat FNA with ultrasound guidance     Specimen Adequacy: Unsatisfactory for evaluation, due to:   ...scant follicular cells present.     B.  Thyroid, Isthmus, Fine Needle Aspiration:   Benign   Consistent with a benign nodule (includes adenomatoid nodule, colloid   nodule, etc.)     The [place] Implied Risk of Malignancy and Recommended Clinical   Management:   Benign has a 0-3% risk of malignancy, recommended management is clinical   follow-up     Specimen  Adequacy: Satisfactory for evaluation.     I have personally reviewed all specimens and/or slides, including the   listed special stains, and used them with my medical judgment to   determine the final diagnosis.     Electronically signed out by:   Lencho Barclay M.D., [facility]     Processed and screened at [facility]     CLINICAL HISTORY:   The patient is a 41 year old female who presented for evaluation of   multiple thyroid nodules.     ,     GROSS:   A. FNA-thyroid, right:  Received are 3 fixed slides, processed for Pap   stain, and 3 air dried slides, processed for Steiner's stain. Material in   RPMI spun down, put in molecular panel tube and held for future studies.     B. FNA-thyroid, isthmus:  Received are 3 fixed slides, processed for Pap   stain, and 3 air dried slides, processed for Steiner's stain. Material in   RPMI spun down, put in molecular panel tube and held for future studies.     MICROSCOPIC:   Microscopic evaluation performed.     Yon Blount MD , Cytopathology Fellow, Justin Barclay MD.     CPT Codes:   A: 67934-TQJY, 91419-[id number]   B: 69113-FGIC, 97449-ESNRYRY     TESTING LAB LOCATION:   [facility], [address]   [id number] [phone]     COLLECTION SITE:   Client:  [facility]   [place]  [facility] [place]        EXAMINATION: US THYROID, 1/8/2018 11:39 AM      COMPARISON: 12/21/2016, 12/30/2016, 10/16/2012     HISTORY: Multinodular goiter. Thyroid nodule biopsy of a nodule in  isthmus nodule on 12/21/2016 yielded benign results.     Technique: Grayscale and color ultrasound imaging of the thyroid was  performed.     Findings:    Thyroid parenchyma: Heterogeneous  The right lobe of the thyroid measures: 8.5 x 3.1 x 3.8 cm, previously  6.5 x 2.9 x 3.3 cm   The thyroid isthmus measures: 1 cm, previously 0.7 cm    The left lobe of the thyroid measures: 7.5 x 2.8 x 4.1 cm, previously  6.7 x 3 x 3.4 cm      Right lobe:  Nodule 1: Previously biopsied.  Nodule measurement: 2.5 x 1.7 x 1.2 cm , previously 2.6 x 1.7 x 1.1 cm  Echogenicity: Isoechoic  Consistency: Cystic and solid  Calcifications: no  Hypervascular: no  Interval growth (>20%): no     Nodule 2:  Nodule measurement: 1.2 x 1.2 x 1.2 cm , previously 1.2 x 1.2 x 1.1 cm  Echogenicity: Isoechoic  Consistency: solid  Calcifications: no  Hypervascular: no  Interval growth (>20%): no     Nodule 3:  Nodule measurement: 0.7 x 0.7 x 0.6 cm , previously 0.8 x 0.7 x 0.4 cm  Echogenicity: Isoechoic  Consistency: solid  Calcifications: no  Hypervascular: no  Interval growth (>20%): no     Isthmus:  Nodule 8: Previously biopsied  Nodule measurement: 2.2 x 1.6 x 0.9 cm , previously 2 x 1.3 x 0.7 cm  Echogenicity: Anechoic  Consistency: cystic  Calcifications: no  Hypervascular: no  Interval growth (>20%): no     Left Lobe:   Nodule 4:  Nodule measurement: 0.4 x 0.5 x 0.5 cm , previously 0.7 x 0.7 x 0.5 cm  Echogenicity: Isoechoic  Consistency: Cystic and solid  Calcifications: no  Hypervascular: no  Interval growth (>20%): no     Nodule 5:  Nodule measurement: 1 x 0.9 x 0.5 cm , previously 1 x 0.9 x 0.4 cm  Echogenicity: Isoechoic  Consistency: solid  Calcifications: no  Hypervascular: no  Interval growth (>20%): no     Nodule 6:  Nodule measurement: 1.9 x 1.8 x 1.1 cm , previously 1.8 x 1.7 x 1 cm  Echogenicity: Isoechoic  Consistency: Cystic and solid  Calcifications: no  Hypervascular: no  Interval growth (>20%): no     Nodule 7:  Nodule measurement: 0.9 x 0.8 x 0.9 cm , previously 0.9 x 0.9 x 0.8 cm  Echogenicity: Hypoechoic  Consistency: solid  Calcifications: no  Hypervascular: no  Interval growth (>20%): no         Impression:  Heterogeneous, enlarging thyroid glands. Multinodular nodules, less  defined as before.     JOCELYN PEPE MD      Again, thank you for allowing me to  participate in the care of your patient.      Sincerely,    Lucia Landin MD

## 2018-11-26 NOTE — MR AVS SNAPSHOT
"              After Visit Summary   11/26/2018    Tete Cage    MRN: [id number]           Patient Information     Date Of Birth          1975        Visit Information        Provider Department      11/26/2018 3:40 PM Lucia Landin MD [facility]        Today's Diagnoses     History of gestational diabetes    -  1    Multinodular goiter          Care Instructions    To expedite your medication refill(s), please contact your pharmacy and have them fax a refill request to: [phone].  *Please allow 3 business days for routine medication refills.  *Please allow 5 business days for controlled substance medication refills.  --------------------  To schedule an appointment (including lab work) you can reach our clinic schedulers at [phone], or you can schedule any follow up appointment directly through [employer] by clicking on the \"Visits\" tab and selecting \"Schedule an Appointment.\"    To ask a question to your Endocrine care team, please send them a [employer] message, or reach them by phone at [phone] and press option #3.    For after-hours urgent Endocrine issues, that do not require 911, please dial [phone], and ask to speak with the Endocrinologist On-Call.    For questions related to your bill, please contact:  [place]: [phone]  [facility]: [phone]    If you do not have enough, or have no insurance for your care, or have questions about possible costs and coverage, please reach out to our [facility] Financial Counselors to discuss with them any options you may have. To reach them, please call [phone].    --------------------  Please Note: If you are active on [employer]), all future test results will be sent by [employer] message only and will no longer be sent by mail. You may also receive communication directly from your physician.            Follow-ups after your visit        Your next 10 appointments already scheduled     Jan 04, 2019 11:30 AM CST "   LAB with  LAB   [facility] ([facility])    [address] [address]   [phone]           Please do not eat 10-12 hours before your appointment if you are coming in fasting for labs on lipids, cholesterol, or glucose (sugar). This does not apply to pregnant women. Water, hot tea and black coffee (with nothing added) are okay. Do not drink other fluids, diet soda or chew gum.            Jan 04, 2019 12:00 PM CST   CT CHEST W/O CONTRAST with UCCT1   [facility] ([facility])    [address] [address]   [phone]           How do I prepare for my exam? (Food and drink instructions) No Food and Drink Restrictions.  How do I prepare for my exam? (Other instructions) You do not need to do anything special to prepare for this exam. For a sinus scan: Use your nose spray (nasal decongestant spray) as directed.  What should I wear: Please wear loose clothing, such as a sweat suit or jogging clothes. Avoid snaps, zippers and other metal. We may ask you to undress and put on a hospital gown.  How long does the exam take: Most scans take less than 20 minutes.  What should I bring: Please bring any scans or X-rays taken at other hospitals, if similar tests were done. Also bring a list of your medicines, including vitamins, minerals and over-the-counter drugs. It is safest to leave personal items at home.  Do I need a : No  is needed.  What do I need to tell my doctor? Be sure to tell your doctor: * If you have any allergies. * If there s any chance you are pregnant. * If you are breastfeeding.  What should I do after the exam: No restrictions, You may resume normal activities.  What is this test: A CT (computed tomography) scan is a series of pictures that allows us to look inside your body. The scanner creates images of the body in cross sections, much like slices of bread.  This helps us see any problems more clearly.  Who should I call with questions: If you have any questions, please call the Imaging Department where you will have your exam. Directions, parking instructions, and other information is available on our website, [email or web address].            Jan 04, 2019 12:20 PM CST   CT SOFT TISSUE NECK W/O CONTRAST with UCCT1   [facility] CT ([facility])    [address] [facility]  [address] [facility] [address] [phone]           How do I prepare for my exam? (Food and drink instructions) No Food and Drink Restrictions.  How do I prepare for my exam? (Other instructions) You do not need to do anything special to prepare for this exam. For a sinus scan: Use your nose spray (nasal decongestant spray) as directed.  What should I wear: Please wear loose clothing, such as a sweat suit or jogging clothes. Avoid snaps, zippers and other metal. We may ask you to undress and put on a hospital gown.  How long does the exam take: Most scans take less than 20 minutes.  What should I bring: Please bring any scans or X-rays taken at other hospitals, if similar tests were done. Also bring a list of your medicines, including vitamins, minerals and over-the-counter drugs. It is safest to leave personal items at home.  Do I need a : No  is needed.  What do I need to tell my doctor? Be sure to tell your doctor: * If you have any allergies. * If there s any chance you are pregnant. * If you are breastfeeding.  What should I do after the exam: No restrictions, You may resume normal activities.  What is this test: A CT (computed tomography) scan is a series of pictures that allows us to look inside your body. The scanner creates images of the body in cross sections, much like slices of bread. This helps us see any problems more clearly.  Who should I call with questions: If you have any questions, please call the Imaging Department where you  "will have your exam. Directions, parking instructions, and other information is available on our website, [email or web address].              Who to contact     Please call your clinic at [phone] to:    Ask questions about your health    Make or cancel appointments    Discuss your medicines    Learn about your test results    Speak to your doctor            Additional Information About Your Visit        MyChart Information     [employer] gives you secure access to your electronic health record. If you see a primary care provider, you can also send messages to your care team and make appointments. If you have questions, please call your primary care clinic.  If you do not have a primary care provider, please call [phone] and they will assist you.      [employer] is an electronic gateway that provides easy, online access to your medical records. With [employer], you can request a clinic appointment, read your test results, renew a prescription or communicate with your care team.     To access your existing account, please contact your [facility] or call [phone] for assistance.        Care EveryWhere ID     This is your Care EveryWhere ID. This could be used by other organizations to access your [facility] medical records  [id number]        Your Vitals Were     Pulse Height BMI (Body Mass Index)             80 1.6 m (5' 3\") 22.94 kg/m2          Blood Pressure from Last 3 Encounters:   11/26/18 105/67   01/03/18 98/68   12/22/16 (!) 88/56    Weight from Last 3 Encounters:   11/26/18 58.7 kg (129 lb 8 oz)   01/03/18 57.6 kg (127 lb)   12/22/16 60.8 kg (134 lb)               Primary Care Provider Office Phone # Fax #    Kannan De Jesus MD PhD [phone]       [address]  [facility] [address]        Equal Access to Services     ANIL HUNTER : Amaury Canseco, augustine vaughn, margaret baker, sasha kathleen. So wa " [phone].    ATENCIÓN: Si maryuri nuñez, tiene a musa disposición servicios gratuitos de asistencia lingüística. Zenia brian [phone].    We comply with applicable federal civil rights laws and [place] laws. We do not discriminate on the basis of race, color, national origin, age, disability, sex, sexual orientation, or gender identity.            Thank you!     Thank you for choosing [facility]  for your care. Our goal is always to provide you with excellent care. Hearing back from our patients is one way we can continue to improve our services. Please take a few minutes to complete the written survey that you may receive in the mail after your visit with us. Thank you!             Your Updated Medication List - Protect others around you: Learn how to safely use, store and throw away your medicines at www.disposemymeds.org.          This list is accurate as of 11/26/18 11:59 PM.  Always use your most recent med list.                   Brand Name Dispense Instructions for use Diagnosis    clindamycin phos-benzoyl perox 1.2-5 % external gel    DUAC    45 g    Apply to face, particularly over jaw line, daily    Acne vulgaris       MULTIVITAMIN PO      Take one tablet daily    Multinodular goiter, History of gestational diabetes       vitamin D 2000 units Caps      Take 4,000 Units by mouth daily as needed

## 2018-11-26 NOTE — PATIENT INSTRUCTIONS
"To expedite your medication refill(s), please contact your pharmacy and have them fax a refill request to: [phone].  *Please allow 3 business days for routine medication refills.  *Please allow 5 business days for controlled substance medication refills.  --------------------  To schedule an appointment (including lab work) you can reach our clinic schedulers at [phone], or you can schedule any follow up appointment directly through [employer] by clicking on the \"Visits\" tab and selecting \"Schedule an Appointment.\"    To ask a question to your Endocrine care team, please send them a [employer] message, or reach them by phone at [phone] and press option #3.    For after-hours urgent Endocrine issues, that do not require 911, please dial [phone], and ask to speak with the Endocrinologist On-Call.    For questions related to your bill, please contact:  [place]: [phone]  [facility]: [phone]    If you do not have enough, or have no insurance for your care, or have questions about possible costs and coverage, please reach out to our [facility] Financial Counselors to discuss with them any options you may have. To reach them, please call [phone].    --------------------  Please Note: If you are active on [employer], all future test results will be sent by [employer] message only and will no longer be sent by mail. You may also receive communication directly from your physician.    "

## 2019-01-10 ENCOUNTER — ANCILLARY PROCEDURE (OUTPATIENT)
Dept: CT IMAGING | Facility: CLINIC | Age: 44
End: 2019-01-10
Payer: COMMERCIAL

## 2019-01-10 DIAGNOSIS — Z86.32 HISTORY OF GESTATIONAL DIABETES: ICD-10-CM

## 2019-01-10 DIAGNOSIS — E04.2 MULTINODULAR GOITER: ICD-10-CM

## 2019-01-10 LAB
HBA1C MFR BLD: 5.8 % (ref 0–5.6)
TSH SERPL DL<=0.005 MIU/L-ACNC: 0.76 MU/L (ref 0.4–4)

## 2019-11-08 ENCOUNTER — HEALTH MAINTENANCE LETTER (OUTPATIENT)
Age: 44
End: 2019-11-08

## 2020-02-23 ENCOUNTER — HEALTH MAINTENANCE LETTER (OUTPATIENT)
Age: 45
End: 2020-02-23

## 2020-12-06 ENCOUNTER — HEALTH MAINTENANCE LETTER (OUTPATIENT)
Age: 45
End: 2020-12-06

## 2021-09-25 ENCOUNTER — HEALTH MAINTENANCE LETTER (OUTPATIENT)
Age: 46
End: 2021-09-25

## 2022-01-15 ENCOUNTER — HEALTH MAINTENANCE LETTER (OUTPATIENT)
Age: 47
End: 2022-01-15

## 2022-05-07 ENCOUNTER — HEALTH MAINTENANCE LETTER (OUTPATIENT)
Age: 47
End: 2022-05-07

## 2022-08-30 ENCOUNTER — OFFICE VISIT (OUTPATIENT)
Dept: FAMILY MEDICINE | Facility: CLINIC | Age: 47
End: 2022-08-30
Payer: COMMERCIAL

## 2022-08-30 VITALS
TEMPERATURE: 97.8 F | WEIGHT: 132.4 LBS | OXYGEN SATURATION: 97 % | BODY MASS INDEX: 23.46 KG/M2 | SYSTOLIC BLOOD PRESSURE: 101 MMHG | HEIGHT: 63 IN | HEART RATE: 78 BPM | DIASTOLIC BLOOD PRESSURE: 63 MMHG | RESPIRATION RATE: 16 BRPM

## 2022-08-30 DIAGNOSIS — D64.9 NORMOCYTIC ANEMIA: ICD-10-CM

## 2022-08-30 DIAGNOSIS — E04.2 MULTINODULAR GOITER: Primary | ICD-10-CM

## 2022-08-30 DIAGNOSIS — Z13.6 CARDIOVASCULAR SCREENING; LDL GOAL LESS THAN 160: ICD-10-CM

## 2022-08-30 DIAGNOSIS — Z12.11 SCREEN FOR COLON CANCER: ICD-10-CM

## 2022-08-30 DIAGNOSIS — Z12.31 VISIT FOR SCREENING MAMMOGRAM: ICD-10-CM

## 2022-08-30 DIAGNOSIS — Z11.59 NEED FOR HEPATITIS C SCREENING TEST: ICD-10-CM

## 2022-08-30 PROCEDURE — 99203 OFFICE O/P NEW LOW 30 MIN: CPT | Performed by: INTERNAL MEDICINE

## 2022-08-30 ASSESSMENT — PAIN SCALES - GENERAL: PAINLEVEL: NO PAIN (0)

## 2022-08-30 NOTE — PROGRESS NOTES
Assessment & Plan     Tete was seen today for thyroid problem.    Diagnoses and all orders for this visit:    Multinodular goiter  -     TSH with free T4 reflex; Future  -     US Head Neck Soft Tissue; Future  -     Basic metabolic panel  (Ca, Cl, CO2, Creat, Gluc, K, Na, BUN); Future    Visit for screening mammogram  -     MA SCREENING DIGITAL BILAT - Future  (s+30); Future    Screen for colon cancer  -     Colonoscopy Screening  Referral; Future    Need for hepatitis C screening test  -     Hepatitis C Screen Reflex to HCV RNA Quant and Genotype; Future    CARDIOVASCULAR SCREENING; LDL GOAL LESS THAN 160    Normocytic anemia  -     CBC with platelets; Future    Other orders  -     REVIEW OF HEALTH MAINTENANCE PROTOCOL ORDERS  -     Cancel: INFLUENZA VACCINE IM > 6 MONTHS VALENT IIV4 (AFLURIA/FLUZONE)    We will schedule neck ultrasound, get labs for TSH and preventive visit labs within the next 1 to 2 weeks.  Return for physical  In 1 month.  Mammogram and colonoscopy ordered as well    Return in about 23 days (around 9/22/2022) for Physical Exam.    Kannan De Jesus MD PhD  [facility]   Tete is a 47 year old, presenting for the following health issues:  Thyroid Problem      History of Present Illness       Hypothyroidism:     Since last visit, patient describes the following symptoms::  None    She eats 2-3 servings of fruits and vegetables daily.She consumes 1 sweetened beverage(s) daily.She exercises with enough effort to increase her heart rate 9 or less minutes per day.  She exercises with enough effort to increase her heart rate 3 or less days per week.   She is taking medications regularly.     AROUND June 20 OR 21, started one week of neck pain and swelling. The pain lasted about 1 week. There is chronic swelling on the anterior neck, lateral neck but the pain was around midline. Home covid negative. No other symptoms. No fever.     Patient has known multinodular  "goiter, previously has seen endocrinology.  Last visit was in 2018.  Around that time she has had prior biopsy as well as a neck CT showing no compression of the airway in January 2019.  There is no specific plan for follow-up.  TSH in 2019 was normal. Patient has not returned to endocrine since that time.     Patient would like to get this rechecked.  She also has a history of anemia in the past would like to check her hemoglobin.  She has not had routine follow-up due to the pandemic.    Review of Systems   Constitutional, HEENT, cardiovascular, pulmonary, gi and gu systems are negative, except as otherwise noted.      Objective    /63   Pulse 78   Temp 97.8  F (36.6  C) (Tympanic)   Resp 16   Ht 1.594 m (5' 2.75\")   Wt 60.1 kg (132 lb 6.4 oz)   LMP 08/09/2022 (Within Days)   SpO2 97%   Breastfeeding No   BMI 23.64 kg/m    Body mass index is 23.64 kg/m .  Physical Exam   GENERAL APPEARANCE: healthy, alert and no distress  NECK: Notable significant bilateral neck enlargement anteriorly over the thyroid area.  There is also central midline puffiness.  Nontender to palpation        "

## 2022-08-30 NOTE — PATIENT INSTRUCTIONS
Additional instructions:  Ultrasound, mammogram/colonoscopy: Call [phone] to schedule at [facility]

## 2022-09-20 ENCOUNTER — LAB (OUTPATIENT)
Dept: LAB | Facility: CLINIC | Age: 47
End: 2022-09-20
Payer: COMMERCIAL

## 2022-09-20 DIAGNOSIS — Z11.59 NEED FOR HEPATITIS C SCREENING TEST: ICD-10-CM

## 2022-09-20 DIAGNOSIS — E04.2 MULTINODULAR GOITER: ICD-10-CM

## 2022-09-20 DIAGNOSIS — D64.9 NORMOCYTIC ANEMIA: ICD-10-CM

## 2022-09-20 DIAGNOSIS — Z13.220 LIPID SCREENING: ICD-10-CM

## 2022-09-20 LAB
ANION GAP SERPL CALCULATED.3IONS-SCNC: 5 MMOL/L (ref 3–14)
BUN SERPL-MCNC: 14 MG/DL (ref 7–30)
CALCIUM SERPL-MCNC: 9 MG/DL (ref 8.5–10.1)
CHLORIDE BLD-SCNC: 109 MMOL/L (ref 94–109)
CO2 SERPL-SCNC: 26 MMOL/L (ref 20–32)
CREAT SERPL-MCNC: 0.64 MG/DL (ref 0.52–1.04)
ERYTHROCYTE [DISTWIDTH] IN BLOOD BY AUTOMATED COUNT: 11.7 % (ref 10–15)
GFR SERPL CREATININE-BSD FRML MDRD: >90 ML/MIN/1.73M2
GLUCOSE BLD-MCNC: 98 MG/DL (ref 70–99)
HCT VFR BLD AUTO: 41.9 % (ref 35–47)
HCV AB SERPL QL IA: NONREACTIVE
HGB BLD-MCNC: 13.7 G/DL (ref 11.7–15.7)
MCH RBC QN AUTO: 30.6 PG (ref 26.5–33)
MCHC RBC AUTO-ENTMCNC: 32.7 G/DL (ref 31.5–36.5)
MCV RBC AUTO: 94 FL (ref 78–100)
PLATELET # BLD AUTO: 231 10E3/UL (ref 150–450)
POTASSIUM BLD-SCNC: 4.2 MMOL/L (ref 3.4–5.3)
RBC # BLD AUTO: 4.48 10E6/UL (ref 3.8–5.2)
SODIUM SERPL-SCNC: 140 MMOL/L (ref 133–144)
TSH SERPL DL<=0.005 MIU/L-ACNC: 0.83 MU/L (ref 0.4–4)
WBC # BLD AUTO: 4.9 10E3/UL (ref 4–11)

## 2022-09-20 PROCEDURE — 86803 HEPATITIS C AB TEST: CPT

## 2022-09-20 PROCEDURE — 84443 ASSAY THYROID STIM HORMONE: CPT

## 2022-09-20 PROCEDURE — 85027 COMPLETE CBC AUTOMATED: CPT

## 2022-09-20 PROCEDURE — 36415 COLL VENOUS BLD VENIPUNCTURE: CPT

## 2022-09-20 PROCEDURE — 80061 LIPID PANEL: CPT

## 2022-09-20 PROCEDURE — 80048 BASIC METABOLIC PNL TOTAL CA: CPT

## 2022-09-22 ENCOUNTER — OFFICE VISIT (OUTPATIENT)
Dept: FAMILY MEDICINE | Facility: CLINIC | Age: 47
End: 2022-09-22
Payer: COMMERCIAL

## 2022-09-22 VITALS
TEMPERATURE: 98.4 F | HEART RATE: 80 BPM | HEIGHT: 63 IN | OXYGEN SATURATION: 98 % | WEIGHT: 133.2 LBS | BODY MASS INDEX: 23.6 KG/M2 | DIASTOLIC BLOOD PRESSURE: 69 MMHG | RESPIRATION RATE: 16 BRPM | SYSTOLIC BLOOD PRESSURE: 107 MMHG

## 2022-09-22 DIAGNOSIS — Z00.00 ROUTINE GENERAL MEDICAL EXAMINATION AT A HEALTH CARE FACILITY: Primary | ICD-10-CM

## 2022-09-22 DIAGNOSIS — Z12.11 SCREEN FOR COLON CANCER: ICD-10-CM

## 2022-09-22 DIAGNOSIS — E04.2 MULTINODULAR GOITER: ICD-10-CM

## 2022-09-22 DIAGNOSIS — Z13.220 LIPID SCREENING: ICD-10-CM

## 2022-09-22 DIAGNOSIS — R87.612 PAPANICOLAOU SMEAR OF CERVIX WITH LOW GRADE SQUAMOUS INTRAEPITHELIAL LESION (LGSIL): ICD-10-CM

## 2022-09-22 DIAGNOSIS — Z12.4 CERVICAL CANCER SCREENING: ICD-10-CM

## 2022-09-22 DIAGNOSIS — N92.0 MENORRHAGIA WITH REGULAR CYCLE: ICD-10-CM

## 2022-09-22 LAB
CHOLEST SERPL-MCNC: 198 MG/DL
HDLC SERPL-MCNC: 61 MG/DL
LDLC SERPL CALC-MCNC: 123 MG/DL
NONHDLC SERPL-MCNC: 137 MG/DL
TRIGL SERPL-MCNC: 72 MG/DL

## 2022-09-22 PROCEDURE — G0145 SCR C/V CYTO,THINLAYER,RESCR: HCPCS | Performed by: INTERNAL MEDICINE

## 2022-09-22 PROCEDURE — 99213 OFFICE O/P EST LOW 20 MIN: CPT | Mod: 25 | Performed by: INTERNAL MEDICINE

## 2022-09-22 PROCEDURE — 99396 PREV VISIT EST AGE 40-64: CPT | Performed by: INTERNAL MEDICINE

## 2022-09-22 PROCEDURE — 87624 HPV HI-RISK TYP POOLED RSLT: CPT | Performed by: INTERNAL MEDICINE

## 2022-09-22 ASSESSMENT — ENCOUNTER SYMPTOMS
ABDOMINAL PAIN: 0
NERVOUS/ANXIOUS: 0
FREQUENCY: 0
HEADACHES: 0
HEMATURIA: 0
CHILLS: 0
JOINT SWELLING: 0
HEARTBURN: 0
BREAST MASS: 0
NAUSEA: 0
MYALGIAS: 0
ARTHRALGIAS: 0
SORE THROAT: 0
DIARRHEA: 0
CONSTIPATION: 0
DIZZINESS: 0
DYSURIA: 0
HEMATOCHEZIA: 0
PARESTHESIAS: 0
FEVER: 0
COUGH: 0
SHORTNESS OF BREATH: 0
EYE PAIN: 0
PALPITATIONS: 0
WEAKNESS: 0

## 2022-09-22 ASSESSMENT — PAIN SCALES - GENERAL: PAINLEVEL: NO PAIN (0)

## 2022-09-22 NOTE — PROGRESS NOTES
mthado   SUBJECTIVE:   CC: Tete is an 47 year old who presents for preventive health visit.       Patient has been advised of split billing requirements and indicates understanding: Yes  Last 2-3 years, menses heavy, clots. Very painful.     Healthy Habits:     Getting at least 3 servings of Calcium per day:  Yes    Bi-annual eye exam:  Yes    Dental care twice a year:  Yes    Sleep apnea or symptoms of sleep apnea:  None    Diet:  Regular (no restrictions)    Frequency of exercise:  1 day/week    Duration of exercise:  Less than 15 minutes    Taking medications regularly:  Not Applicable    Medication side effects:  Not applicable    PHQ-2 Total Score: 0    Additional concerns today:  Yes        Today's PHQ-2 Score:   PHQ-2 ( 1999 [employer]) 9/22/2022   Q1: Little interest or pleasure in doing things 0   Q2: Feeling down, depressed or hopeless 0   PHQ-2 Score 0   Q1: Little interest or pleasure in doing things Not at all   Q2: Feeling down, depressed or hopeless Not at all   PHQ-2 Score 0       Abuse: Current or Past (Physical, Sexual or Emotional) - No  Do you feel safe in your environment? Yes    Have you ever done Advance Care Planning? (For example, a Health Directive, POLST, or a discussion with a medical provider or your loved ones about your wishes): No, advance care planning information given to patient to review.  Patient plans to discuss their wishes with loved ones or provider.      Social History     Tobacco Use     Smoking status: Never Smoker     Smokeless tobacco: Never Used   Substance Use Topics     Alcohol use: No     If you drink alcohol do you typically have >3 drinks per day or >7 drinks per week? No    Alcohol Use 9/22/2022   Prescreen: >3 drinks/day or >7 drinks/week? No   Prescreen: >3 drinks/day or >7 drinks/week? -   No flowsheet data found.    Reviewed orders with patient.  Reviewed health maintenance and updated orders accordingly - Yes  Labs reviewed in EPIC    Breast Cancer  "Screening:    FHS-7: No flowsheet data found.      Pertinent mammograms are reviewed under the imaging tab.    History of abnormal Pap smear: YES - updated in Problem List and Health Maintenance accordingly  PAP / HPV Latest Ref Rng & Units 12/22/2016 11/15/2011 6/11/2009   PAP (Historical) - NIL NIL NIL   HPV16 NEG Negative - -   HPV18 NEG Negative - -   HRHPV NEG Negative - -     Reviewed and updated as needed this visit by clinical staff   Tobacco  Allergies  Meds   Med Hx  Surg Hx  Fam Hx  Soc Hx          Reviewed and updated as needed this visit by Provider                     Review of Systems   Constitutional: Negative for chills and fever.   HENT: Negative for congestion, ear pain, hearing loss and sore throat.    Eyes: Negative for pain and visual disturbance.   Respiratory: Negative for cough and shortness of breath.    Cardiovascular: Negative for chest pain, palpitations and peripheral edema.   Gastrointestinal: Negative for abdominal pain, constipation, diarrhea, heartburn, hematochezia and nausea.   Breasts:  Negative for tenderness, breast mass and discharge.   Genitourinary: Positive for vaginal discharge (occasional). Negative for dysuria, frequency, genital sores, hematuria, pelvic pain, urgency and vaginal bleeding.   Musculoskeletal: Negative for arthralgias, joint swelling and myalgias.   Skin: Negative for rash.   Neurological: Negative for dizziness, weakness, headaches and paresthesias.   Psychiatric/Behavioral: Negative for mood changes. The patient is not nervous/anxious.         OBJECTIVE:   /69 (BP Location: Right arm, Patient Position: Sitting, Cuff Size: Adult Regular)   Pulse 80   Temp 98.4  F (36.9  C) (Oral)   Resp 16   Ht 1.6 m (5' 3\")   Wt 60.4 kg (133 lb 3.2 oz)   LMP 09/15/2022 (Exact Date)   SpO2 98%   BMI 23.60 kg/m    Physical Exam  GENERAL: healthy, alert and no distress  EYES: Eyes grossly normal to inspection, PERRL and conjunctivae and sclerae " normal  HENT: ear canals and TM's normal, nose and mouth without ulcers or lesions  NECK: no adenopathy, no asymmetry, masses, or scars and thyroid normal to palpation  RESP: lungs clear to auscultation - no rales, rhonchi or wheezes  BREAST: normal without masses, tenderness or nipple discharge and no palpable axillary masses or adenopathy  CV: regular rate and rhythm, normal S1 S2, no S3 or S4, no murmur, click or rub, no peripheral edema and peripheral pulses strong  ABDOMEN: soft, nontender, no hepatosplenomegaly, no masses and bowel sounds normal  MS: no gross musculoskeletal defects noted, no edema  SKIN: no suspicious lesions or rashes  NEURO: Normal strength and tone, mentation intact and speech normal  PSYCH: mentation appears normal, affect normal/bright    Diagnostic Test Results:  Lab on 09/20/2022   Component Date Value Ref Range Status     Hepatitis C Antibody 09/20/2022 Nonreactive  Nonreactive Final     TSH 09/20/2022 0.83  0.40 - 4.00 mU/L Final     WBC Count 09/20/2022 4.9  4.0 - 11.0 10e3/uL Final     RBC Count 09/20/2022 4.48  3.80 - 5.20 10e6/uL Final     Hemoglobin 09/20/2022 13.7  11.7 - 15.7 g/dL Final     Hematocrit 09/20/2022 41.9  35.0 - 47.0 % Final     MCV 09/20/2022 94  78 - 100 fL Final     MCH 09/20/2022 30.6  26.5 - 33.0 pg Final     MCHC 09/20/2022 32.7  31.5 - 36.5 g/dL Final     RDW 09/20/2022 11.7  10.0 - 15.0 % Final     Platelet Count 09/20/2022 231  150 - 450 10e3/uL Final     Sodium 09/20/2022 140  133 - 144 mmol/L Final     Potassium 09/20/2022 4.2  3.4 - 5.3 mmol/L Final     Chloride 09/20/2022 109  94 - 109 mmol/L Final     Carbon Dioxide (CO2) 09/20/2022 26  20 - 32 mmol/L Final     Anion Gap 09/20/2022 5  3 - 14 mmol/L Final     Urea Nitrogen 09/20/2022 14  7 - 30 mg/dL Final     Creatinine 09/20/2022 0.64  0.52 - 1.04 mg/dL Final     Calcium 09/20/2022 9.0  8.5 - 10.1 mg/dL Final     Glucose 09/20/2022 98  70 - 99 mg/dL Final     GFR Estimate 09/20/2022 >90  >60  "mL/min/1.73m2 Final    Effective December 21, 2021 eGFRcr in adults is calculated using the 2021 CKD-EPI creatinine equation which includes age and gender (Sydney et al., NEJ, DOI: 10.1056/ZHNLse8699922)         ASSESSMENT/PLAN:   Tete was seen today for physical.    Diagnoses and all orders for this visit:    Routine general medical examination at a health care facility    Screen for colon cancer  -     Colonoscopy Screening  Referral; Future    Cervical cancer screening  -     Pap Screen with HPV - recommended age 30 - 65 years    Menorrhagia with regular cycle  -     US Pelvic Complete with Transvaginal; Future    Papanicolaou smear of cervix with low grade squamous intraepithelial lesion (LGSIL)  -     Pap Screen with HPV - recommended age 30 - 65 years    Lipid screening  -     Lipid panel reflex to direct LDL Fasting; Future    Multinodular goiter  Comments:  US ordered at previous visit but not done yet. encouraged pt to schedule. TSH normal.       Pt declined vaccines.     Patient has been advised of split billing requirements and indicates understanding: Yes    COUNSELING:  Reviewed preventive health counseling, as reflected in patient instructions    Estimated body mass index is 23.6 kg/m  as calculated from the following:    Height as of this encounter: 1.6 m (5' 3\").    Weight as of this encounter: 60.4 kg (133 lb 3.2 oz).        She reports that she has never smoked. She has never used smokeless tobacco.      Counseling Resources:  ATP IV Guidelines  Pooled Cohorts Equation Calculator  Breast Cancer Risk Calculator  BRCA-Related Cancer Risk Assessment: FHS-7 Tool  FRAX Risk Assessment  ICSI Preventive Guidelines  Dietary Guidelines for Americans, 2010  USDA's MyPlate  ASA Prophylaxis  Lung CA Screening    Kannan De Jesus MD PhD  [facility]"

## 2022-09-26 LAB
BKR LAB AP GYN ADEQUACY: NORMAL
BKR LAB AP GYN INTERPRETATION: NORMAL
BKR LAB AP GYN OTHER FINDINGS: NORMAL
BKR LAB AP HPV REFLEX: NORMAL
BKR LAB AP LMP: NORMAL
BKR LAB AP PREVIOUS ABNORMAL: NORMAL
PATH REPORT.COMMENTS IMP SPEC: NORMAL
PATH REPORT.COMMENTS IMP SPEC: NORMAL
PATH REPORT.RELEVANT HX SPEC: NORMAL

## 2022-09-29 LAB
HUMAN PAPILLOMA VIRUS 16 DNA: NEGATIVE
HUMAN PAPILLOMA VIRUS 18 DNA: NEGATIVE
HUMAN PAPILLOMA VIRUS FINAL DIAGNOSIS: NORMAL
HUMAN PAPILLOMA VIRUS OTHER HR: NEGATIVE

## 2023-01-03 ENCOUNTER — VIRTUAL VISIT (OUTPATIENT)
Dept: FAMILY MEDICINE | Facility: CLINIC | Age: 48
End: 2023-01-03
Payer: COMMERCIAL

## 2023-01-03 ENCOUNTER — MYC MEDICAL ADVICE (OUTPATIENT)
Dept: FAMILY MEDICINE | Facility: CLINIC | Age: 48
End: 2023-01-03

## 2023-01-03 DIAGNOSIS — U07.1 INFECTION DUE TO 2019 NOVEL CORONAVIRUS: Primary | ICD-10-CM

## 2023-01-03 PROCEDURE — 99213 OFFICE O/P EST LOW 20 MIN: CPT | Mod: 95 | Performed by: INTERNAL MEDICINE

## 2023-01-03 NOTE — PATIENT INSTRUCTIONS
Additional instructions:      COVID-19 Outpatient Treatments  Your care team can help you find the best treatments for COVID-19. Talk to a health care provider or refer to the FDA medicine fact sheets below.    Important: You can't have Paxlovid or molnupiravir if you're starting the medicine more than 5 days after your symptoms have started.  Paxlovid: https://www.fda.gov/media/471863/download  Molnupiravir (Lagevrio): https://www.fda.gov/media/446531/download  Paxlovid (nimatrelvir and ritonavir)  How it works  Two medicines (nirmatrelvir and ritonavir) are taken together. They stop the virus from growing. Less amount of virus is easier for your body to fight.  Benefits  Lowers risk of a hospital stay or death from COVID-19.  How to take    Medicine comes in a daily container with both medicine tablets. Take by mouth twice daily (once in the morning, once at night) for 5 days.    The number of tablets to take varies by patient.    Don't chew or break capsules. Swallow whole.  When to take  Take as soon as possible after positive COVID-19 test result, and within 5 days of your first symptoms.  Who can take it  Patients must be 12 years or older, weigh at least 88 pounds, and have tested positive for COVID-19. Paxlovid is the preferred treatment for pregnant patients.  Possible side effects  Can cause altered sense of taste, diarrhea (loose, watery stools), high blood pressure, muscle aches.  Medicine conflicts    Some medicines may conflict with Paxlovid and may cause serious side effects.    Tell your care team about all the medicines you take, including prescription and over-the-counter medicines, vitamins, and herbal supplements.    Your care team will review your medicines to make sure that you can safely take Paxlovid.  Cautions    Paxlovid is not advised for patients with severe kidney or liver disease. If you have kidney or liver problems, the dose may need to be changed.    If you're pregnant or  breastfeeding, talk to your care team about your options.    If you take hormonal birth control (such as the Pill), then you or your partner should also use a non-hormonal form of birth control (such as a condom). Keep doing this for 1 menstrual cycle after your last dose of Paxlovid.      Coping with Life After COVID-19  Being in the hospital because of COVID-19 is scary. Going home can be scary, too. You may face changes to your life, the way you work or what you can eat. It s hard to adjust to change, and it s normal to feel afraid, frustrated or even angry. These feelings usually go away over time. If your feelings don t start to get better, it s called  adjustment disorder.      What signs should I look out for?  Adjustment disorder can happen to anyone in a time of stress. It makes it hard to cope with daily life. You may feel lonely or fight with loved ones, even if you re glad to be home. Watch for these signs:  Fear or worry  Hard time focusing  Sadness or anger  Trouble talking to family or friends  Feeling like you don t fit in or isolating yourself  Problems with sleep   Drinking alcohol or taking drugs to cope    What can I do?  You can help yourself get better. Feeling you have control helps you move forward. You may wonder if you ll be able to do things you did before. Be patient. Do your best to make the most of every day. Try to build relationships, be as active as you can, eat right and keep a sense of humor. Avoid smoking and drinking too much alcohol. Call your family doctor or clinic if you re not sure what to do. They can guide you to care or other services.    When should I get help?  Think about getting counseling if your sadness or frustration gets worse. Together with a trained counselor, you can talk about your problems adjusting to life after your hospital stay. You can come up with new ways to handle changes that give you more control. Your family doctor or care team can help you find a  counselor.     Get help if you re thinking about hurting yourself. If you need help right away, call 911 or go to the nearest emergency room. You can also try the Crisis Text Line.    Crisis Text Line: 966-215 (http://www.crisistextline.org)  The Crisis Text Line serves anyone, in any crisis. It gives free, 24/7 support. Here's how it works:  Text HOME to 060907 from anywhere in the [place], anytime, about any type of crisis.  A live, trained Crisis Counselor will text you back quickly.  The volunteer Crisis Counselor can help you move from a  hot moment  to a  cool moment.  They can also help you work out a safety plan.

## 2023-01-03 NOTE — PROGRESS NOTES
Tete is a 47 year old who is being evaluated via a billable telephone visit.      What phone number would you like to be contacted at? [phone]   How would you like to obtain your AVS? [facility]  Distant Location (provider location):  Off-site    Assessment & Plan     Tete was seen today for chest pain.    Diagnoses and all orders for this visit:    Infection due to 2019 novel coronavirus  -     nirmatrelvir and ritonavir (PAXLOVID) therapy pack; Take 3 tablets by mouth 2 times daily for 5 days (Take 2 Nirmatrelvir tablets and 1 Ritonavir tablet twice daily for 5 days)       Patient later submitted home test via [facility], positive.    Patient is healthy, no risk factors. Discussed medication use, benefits, side effects, and potential for rebound. However patient requested paxlovid. I agreed to send her a prescription. If hypoxic, respiratory distress, should go to ER.     Return in about 1 week (around 1/10/2023), or if symptoms worsen or fail to improve.    Kannan De Jesus MD PhD  [facility]   Tete is a 47 year old, presenting for the following health issues:  Chest Pain      HPI   Tested negative 1/2/22  Concern - discomfort in chest, cough  Onset: 1/2/22  Description: discomfort in chest, some kind of pain   Intensity: moderate  Progression of Symptoms:  same  Accompanying Signs & Symptoms: cough, discomfort, white mucus, tired, losing voice, joint pain  Previous history of similar problem: none  Precipitating factors:        Worsened by: none  Alleviating factors:        Improved by: tylenol   Therapies tried and outcome: tylenol, zinc tablet, vitam c        99.2 highest temp.  Cough last night, body aches, sore throat, slight runny nose today.   Some chest burning sensation and discomfort. No wheezing. No history of asthma or covpd.        symptoms 12/31/2022, tested positive the second time. Knee pain. He had virtual visit today and was prescribed Paxlovid.     Pt would like  to get a prescription. She doesn't have risk factors. Hasn't done home test.       Review of Systems    Constitutional, HEENT, cardiovascular, pulmonary, gi and gu systems are negative, except as otherwise noted.      Objective    Vitals - Patient Reported  SpO2 (Patient Reported): 95  Temperature (Patient Reported): 98.5  F (36.9  C)  Pulse (Patient Reported): 110  Pain Score: Mild Pain (3)      Vitals:  No vitals were obtained today due to virtual visit.    Physical Exam   healthy, alert and no distress  PSYCH: Alert and oriented times 3; coherent speech, normal   rate and volume, able to articulate logical thoughts, able   to abstract reason, no tangential thoughts, no hallucinations   or delusions  Her affect is normal  RESP: No cough, no audible wheezing, able to talk in full sentences  Remainder of exam unable to be completed due to telephone visits          Phone call duration: 15 minutes

## 2023-01-07 ENCOUNTER — HEALTH MAINTENANCE LETTER (OUTPATIENT)
Age: 48
End: 2023-01-07

## 2023-06-02 ENCOUNTER — HEALTH MAINTENANCE LETTER (OUTPATIENT)
Age: 48
End: 2023-06-02

## 2023-08-23 ENCOUNTER — PATIENT OUTREACH (OUTPATIENT)
Dept: CARE COORDINATION | Facility: CLINIC | Age: 48
End: 2023-08-23
Payer: COMMERCIAL

## 2023-09-06 ENCOUNTER — PATIENT OUTREACH (OUTPATIENT)
Dept: CARE COORDINATION | Facility: CLINIC | Age: 48
End: 2023-09-06
Payer: COMMERCIAL

## 2023-11-07 ENCOUNTER — MYC MEDICAL ADVICE (OUTPATIENT)
Dept: FAMILY MEDICINE | Facility: CLINIC | Age: 48
End: 2023-11-07
Payer: COMMERCIAL

## 2023-11-07 DIAGNOSIS — Z12.31 ENCOUNTER FOR SCREENING MAMMOGRAM FOR MALIGNANT NEOPLASM OF BREAST: Primary | ICD-10-CM

## 2023-11-07 DIAGNOSIS — Z12.11 COLON CANCER SCREENING: ICD-10-CM

## 2023-11-08 NOTE — TELEPHONE ENCOUNTER
Mammogram order placed on 22 and colonoscopy order placed on 22. Both orders  after a year.     Routing to provider to review and advise.     Lin Ortiz, STEPHANEN, RN   [facility]

## 2023-11-30 ENCOUNTER — HOSPITAL ENCOUNTER (OUTPATIENT)
Facility: AMBULATORY SURGERY CENTER | Age: 48
End: 2023-11-30
Attending: INTERNAL MEDICINE
Payer: COMMERCIAL

## 2023-11-30 ENCOUNTER — MYC MEDICAL ADVICE (OUTPATIENT)
Dept: FAMILY MEDICINE | Facility: CLINIC | Age: 48
End: 2023-11-30

## 2023-11-30 ENCOUNTER — TELEPHONE (OUTPATIENT)
Dept: GASTROENTEROLOGY | Facility: CLINIC | Age: 48
End: 2023-11-30

## 2023-11-30 NOTE — TELEPHONE ENCOUNTER
"Endoscopy Scheduling Screen    Have you had a positive Covid test in the last 14 days?  No    Are you active on MyChart?   Yes    What insurance is in the chart?  Other:  [facility]    Ordering/Referring Provider:   FLASH ACEVEDO        (If ordering provider performs procedure, schedule with ordering provider unless otherwise instructed. )    BMI: Estimated body mass index is 23.6 kg/m  as calculated from the following:    Height as of 9/22/22: 1.6 m (5' 3\").    Weight as of 9/22/22: 60.4 kg (133 lb 3.2 oz).     Sedation Ordered  moderate sedation.   If patient BMI > 50 do not schedule in ASC.    If patient BMI > 45 do not schedule at [facility].    Are you taking methadone or Suboxone?  No    Are you taking any prescription medications for pain 3 or more times per week?   No    Do you have a history of malignant hyperthermia or adverse reaction to anesthesia?  No    (Females) Are you currently pregnant?   No     Have you been diagnosed or told you have pulmonary hypertension?   No    Do you have an LVAD?  No    Have you been told you have moderate to severe sleep apnea?  No    Have you been told you have COPD, asthma, or any other lung disease?  No    Do you have any heart conditions?  No     Have you ever had an organ transplant?   No    Have you ever had or are you awaiting a heart or lung transplant?   No    Have you had a stroke or transient ischemic attack (TIA aka \"mini stroke\" in the last 6 months?   No    Have you been diagnosed with or been told you have cirrhosis of the liver?   No    Are you currently on dialysis?   No    Do you need assistance transferring?   No    BMI: Estimated body mass index is 23.6 kg/m  as calculated from the following:    Height as of 9/22/22: 1.6 m (5' 3\").    Weight as of 9/22/22: 60.4 kg (133 lb 3.2 oz).     Is patients BMI > 40 and scheduling location [facility]?  No    Do you take an injectable medication for weight loss or diabetes (excluding insulin)?  No    Do you take the medication " Naltrexone?  No    Do you take blood thinners?  No       Prep   Are you currently on dialysis or do you have chronic kidney disease?  No    Do you have a diagnosis of diabetes?  No    Do you have a diagnosis of cystic fibrosis (CF)?  No    On a regular basis do you go 3 -5 days between bowel movements?  No    BMI > 40?  No    Preferred Pharmacy:    [facility] [address] [place], [place] - [address] AT [place] [address]  [address]  [facility] [address]  Phone: [phone] Fax: [phone]      Final Scheduling Details   Colonoscopy prep sent?  Standard MiraLAX    Procedure scheduled  Colonoscopy    Surgeon:  DAWSON     Date of procedure:  03/04/2024     Pre-OP / PAC:   No - Not required for this site.    Location  [facility] - [facility] - Patient preference.    Sedation   Moderate Sedation - Per order.      Patient Reminders:   You will receive a call from a Nurse to review instructions and health history.  This assessment must be completed prior to your procedure.  Failure to complete the Nurse assessment may result in the procedure being cancelled.      On the day of your procedure, please designate an adult(s) who can drive you home stay with you for the next 24 hours. The medicines used in the exam will make you sleepy. You will not be able to drive.      You cannot take public transportation, ride share services, or non-medical taxi service without a responsible caregiver.  Medical transport services are allowed with the requirement that a responsible caregiver will receive you at your destination.  We require that drivers and caregivers are confirmed prior to your procedure.

## 2023-12-02 ENCOUNTER — HEALTH MAINTENANCE LETTER (OUTPATIENT)
Age: 48
End: 2023-12-02

## 2024-01-08 ENCOUNTER — ANCILLARY PROCEDURE (OUTPATIENT)
Dept: MAMMOGRAPHY | Facility: CLINIC | Age: 49
End: 2024-01-08
Attending: INTERNAL MEDICINE
Payer: COMMERCIAL

## 2024-01-08 DIAGNOSIS — Z12.31 ENCOUNTER FOR SCREENING MAMMOGRAM FOR MALIGNANT NEOPLASM OF BREAST: ICD-10-CM

## 2024-01-08 PROCEDURE — 77063 BREAST TOMOSYNTHESIS BI: CPT | Mod: GC | Performed by: STUDENT IN AN ORGANIZED HEALTH CARE EDUCATION/TRAINING PROGRAM

## 2024-01-08 PROCEDURE — 77067 SCR MAMMO BI INCL CAD: CPT | Mod: GC | Performed by: STUDENT IN AN ORGANIZED HEALTH CARE EDUCATION/TRAINING PROGRAM

## 2024-02-12 ENCOUNTER — TELEPHONE (OUTPATIENT)
Dept: GASTROENTEROLOGY | Facility: CLINIC | Age: 49
End: 2024-02-12
Payer: COMMERCIAL

## 2024-02-12 NOTE — TELEPHONE ENCOUNTER
Caller: patient  Reason for Reschedule/Cancellation (please be detailed, any staff messages or encounters to note?): not interested currently      Prior to reschedule please review:  Ordering Provider: FLASH ACEVEDO   Sedation Determined: moderate   Does patient have any ASC Exclusions, please identify?: no      Notes on Cancelled Procedure:  Procedure: Lower Endoscopy [Colonoscopy]   Date: 3/4  Location: [facility]; [address], [place] [address]  Surgeon: francia      Rescheduled: No

## 2025-02-05 SDOH — HEALTH STABILITY: PHYSICAL HEALTH: ON AVERAGE, HOW MANY MINUTES DO YOU ENGAGE IN EXERCISE AT THIS LEVEL?: 0 MIN

## 2025-02-05 SDOH — HEALTH STABILITY: PHYSICAL HEALTH: ON AVERAGE, HOW MANY DAYS PER WEEK DO YOU ENGAGE IN MODERATE TO STRENUOUS EXERCISE (LIKE A BRISK WALK)?: 0 DAYS

## 2025-02-05 ASSESSMENT — SOCIAL DETERMINANTS OF HEALTH (SDOH): HOW OFTEN DO YOU GET TOGETHER WITH FRIENDS OR RELATIVES?: ONCE A WEEK

## 2025-02-06 ENCOUNTER — OFFICE VISIT (OUTPATIENT)
Dept: FAMILY MEDICINE | Facility: CLINIC | Age: 50
End: 2025-02-06
Payer: COMMERCIAL

## 2025-02-06 VITALS
HEIGHT: 63 IN | WEIGHT: 133.38 LBS | OXYGEN SATURATION: 95 % | SYSTOLIC BLOOD PRESSURE: 98 MMHG | BODY MASS INDEX: 23.63 KG/M2 | HEART RATE: 84 BPM | DIASTOLIC BLOOD PRESSURE: 65 MMHG | RESPIRATION RATE: 16 BRPM | TEMPERATURE: 96.9 F

## 2025-02-06 DIAGNOSIS — Z13.1 DIABETES MELLITUS SCREENING: ICD-10-CM

## 2025-02-06 DIAGNOSIS — Z12.11 SCREEN FOR COLON CANCER: ICD-10-CM

## 2025-02-06 DIAGNOSIS — Z12.31 ENCOUNTER FOR SCREENING MAMMOGRAM FOR MALIGNANT NEOPLASM OF BREAST: ICD-10-CM

## 2025-02-06 DIAGNOSIS — Z13.6 CARDIOVASCULAR SCREENING; LDL GOAL LESS THAN 160: ICD-10-CM

## 2025-02-06 DIAGNOSIS — E04.2 MULTINODULAR GOITER: ICD-10-CM

## 2025-02-06 DIAGNOSIS — Z78.9 HEPATITIS B VIRUS SEROLOGIC STATUS UNKNOWN: ICD-10-CM

## 2025-02-06 DIAGNOSIS — Z00.00 ROUTINE GENERAL MEDICAL EXAMINATION AT A HEALTH CARE FACILITY: Primary | ICD-10-CM

## 2025-02-06 DIAGNOSIS — N84.0 ENDOMETRIAL POLYP: ICD-10-CM

## 2025-02-06 ASSESSMENT — PAIN SCALES - GENERAL: PAINLEVEL_OUTOF10: NO PAIN (0)

## 2025-02-06 NOTE — PROGRESS NOTES
Preventive Care Visit  [facility]  Kannan De Jesus MD PhD, Internal Medicine - Pediatrics  Feb 6, 2025      Assessment & Plan     Tete was seen today for physical.    Diagnoses and all orders for this visit:    Routine general medical examination at a health care facility    Screen for colon cancer  -     Colonoscopy Screening  Referral; Future    Endometrial polyp  -     US Pelvic Complete with Transvaginal; Future    Encounter for screening mammogram for malignant neoplasm of breast  -     MA Screening with Implants Bilateral w/ Mark Anthony; Future    CARDIOVASCULAR SCREENING; LDL GOAL LESS THAN 160  -     Cancel: Lipid panel reflex to direct LDL Fasting; Future  -     Lipid panel reflex to direct LDL Fasting; Future    Diabetes mellitus screening  -     Cancel: Glucose; Future  -     Glucose; Future    Multinodular goiter  -     TSH with free T4 reflex; Future    Hepatitis B virus serologic status unknown  -     Hepatitis B Surface Antibody; Future    Other orders  -     REVIEW OF HEALTH MAINTENANCE PROTOCOL ORDERS  -     TDAP 10-64Y (ADACEL,BOOSTRIX)  -     INFLUENZA VACCINE,SPLIT VIRUS,TRIVALENT,PF(FLUZONE)  -     PRIMARY CARE FOLLOW-UP SCHEDULING; Future       Review ultrasound done from [place].  Thyroid ultrasound identified the largest nodule being 1.6 cm.  This is smaller than the nodules seen on ultrasound at our facility in January 2018.  Check TSH today.    Questionable endometrial polyp.  Pelvic ultrasound to check if polyp is still present or if she is getting more.  Continue to have regular menses.    If labs are acceptable, return in 1 year for physical/wellness and chronic disease review/lab monitoring.        Counseling  Appropriate preventive services were addressed with this patient via screening, questionnaire, or discussion as appropriate for fall prevention, nutrition, physical activity, Tobacco-use cessation, social engagement, weight loss and cognition.  Checklist reviewing  preventive services available has been given to the patient.  Reviewed patient's diet, addressing concerns and/or questions.   The patient was instructed to see the dentist every 6 months.           Manoj Epstein is a 49 year old, presenting for the following:  Physical       Pt was in [place] last April/May, had thyroid ultrasound and pelvic ultrasound.   Confirmed multinodular goiter, no further treatment needed.  Pelvic ultrasound showed endometrial polyp. Recommended resection but she was going back to the [place] and didn't do this.     Healthy Habits:     Getting at least 3 servings of Calcium per day:  Yes    Bi-annual eye exam:  Yes    Dental care twice a year:  NO    Sleep apnea or symptoms of sleep apnea:  None    Diet:  Regular (no restrictions)    Frequency of exercise:  None    Duration of exercise:  N/A    Taking medications regularly:  Not Applicable    Barriers to taking medications:  Not applicable    Medication side effects:  Not applicable    Additional concerns today:  Yes ([facility] Endorinology)      Health Care Directive  Patient does not have a Health Care Directive: Discussed advance care planning with patient; however, patient declined at this time.      2/5/2025   General Health   How would you rate your overall physical health? Good   Feel stress (tense, anxious, or unable to sleep) Only a little   (!) STRESS CONCERN      2/5/2025   Nutrition   Three or more servings of calcium each day? Yes   Diet: Regular (no restrictions)   How many servings of fruit and vegetables per day? (!) 2-3   How many sweetened beverages each day? 0-1         2/5/2025   Exercise   Days per week of moderate/strenous exercise 0 days   Average minutes spent exercising at this level 0 min   (!) EXERCISE CONCERN      2/5/2025   Social Factors   Frequency of gathering with friends or relatives Once a week   Worry food won't last until get money to buy more No   Food not last or not have enough money for food? No   Do you have  housing? (Housing is defined as stable permanent housing and does not include staying ouside in a car, in a tent, in an abandoned building, in an overnight shelter, or couch-surfing.) Yes   Are you worried about losing your housing? No   Lack of transportation? No   Unable to get utilities (heat,electricity)? No         2/5/2025   Dental   Dentist two times every year? (!) NO            Today's PHQ-2 Score:       2/5/2025     3:17 PM   PHQ-2 ( 1999 [employer])   Q1: Little interest or pleasure in doing things 1   Q2: Feeling down, depressed or hopeless 0   PHQ-2 Score 1    Q1: Little interest or pleasure in doing things Several days   Q2: Feeling down, depressed or hopeless Not at all   PHQ-2 Score 1       Patient-reported           2/5/2025   Substance Use   Alcohol more than 3/day or more than 7/wk No   Do you use any other substances recreationally? No     Social History     Tobacco Use    Smoking status: Never     Passive exposure: Never    Smokeless tobacco: Never   Vaping Use    Vaping status: Never Used   Substance Use Topics    Alcohol use: No    Drug use: No           1/8/2024   LAST FHS-7 RESULTS   1st degree relative breast or ovarian cancer No   Any relative bilateral breast cancer No   Any male have breast cancer No   Any ONE woman have BOTH breast AND ovarian cancer No   Any woman with breast cancer before 50yrs No   2 or more relatives with breast AND/OR ovarian cancer No   2 or more relatives with breast AND/OR bowel cancer No        Mammogram Screening - Mammogram every 1-2 years updated in Health Maintenance based on mutual decision making        2/5/2025   STI Screening   New sexual partner(s) since last STI/HIV test? No     History of abnormal Pap smear: No - age 30- 64 PAP with HPV every 5 years recommended        Latest Ref Rng & Units 9/22/2022     1:46 PM 12/22/2016     9:48 AM 12/22/2016    12:00 AM   PAP / HPV   PAP  Negative for Intraepithelial Lesion or Malignancy (NILM)      PAP (Historical)  "   NIL    HPV 16 DNA Negative Negative  Negative     HPV 18 DNA Negative Negative  Negative     Other HR HPV Negative Negative  Negative       ASCVD Risk   The 10-year ASCVD risk score (Jordy BERMUDEZ, et al., 2019) is: 0.5%    Values used to calculate the score:      Age: 49 years      Sex: Female      Is Non- : No      Diabetic: No      Tobacco smoker: No      Systolic Blood Pressure: 98 mmHg      Is BP treated: No      HDL Cholesterol: 61 mg/dL      Total Cholesterol: 198 mg/dL        2/5/2025   Contraception/Family Planning   Questions about contraception or family planning No        Reviewed and updated as needed this visit by Provider                          Review of Systems  Constitutional, HEENT, cardiovascular, pulmonary, gi and gu systems are negative, except as otherwise noted.     Objective    Exam  BP 98/65 (BP Location: Right arm, Patient Position: Sitting, Cuff Size: Adult Large)   Pulse 84   Temp 96.9  F (36.1  C) (Oral)   Resp 16   Ht 1.588 m (5' 2.5\")   Wt 60.5 kg (133 lb 6 oz)   LMP 01/18/2025   SpO2 95%   BMI 24.01 kg/m     Estimated body mass index is 24.01 kg/m  as calculated from the following:    Height as of this encounter: 1.588 m (5' 2.5\").    Weight as of this encounter: 60.5 kg (133 lb 6 oz).    Physical Exam  GENERAL: alert and no distress  EYES: Eyes grossly normal to inspection, PERRL and conjunctivae and sclerae normal  HENT: ear canals and TM's normal, nose and mouth without ulcers or lesions  NECK: no adenopathy, no asymmetry, masses, or scars  RESP: lungs clear to auscultation - no rales, rhonchi or wheezes  CV: regular rate and rhythm, normal S1 S2, no S3 or S4, no murmur, click or rub, no peripheral edema  ABDOMEN: soft, nontender, no hepatosplenomegaly, no masses and bowel sounds normal  MS: no gross musculoskeletal defects noted, no edema  SKIN: no suspicious lesions or rashes  NEURO: Normal strength and tone, mentation intact and speech " normal  PSYCH: mentation appears normal, affect normal/bright        Signed Electronically by: Kannan De Jesus MD PhD

## 2025-02-06 NOTE — PATIENT INSTRUCTIONS
Patient Education   Preventive Care Advice   This is general advice given by our system to help you stay healthy. However, your care team may have specific advice just for you. Please talk to your care team about your preventive care needs.  Nutrition  Eat 5 or more servings of fruits and vegetables each day.  Try wheat bread, brown rice and whole grain pasta (instead of white bread, rice, and pasta).  Get enough calcium and vitamin D. Check the label on foods and aim for 100% of the RDA (recommended daily allowance).  Lifestyle  Exercise at least 150 minutes each week  (30 minutes a day, 5 days a week).  Do muscle strengthening activities 2 days a week. These help control your weight and prevent disease.  No smoking.  Wear sunscreen to prevent skin cancer.  Have a dental exam and cleaning every 6 months.  Yearly exams  See your health care team every year to talk about:  Any changes in your health.  Any medicines your care team has prescribed.  Preventive care, family planning, and ways to prevent chronic diseases.  Shots (vaccines)   HPV shots (up to age 26), if you've never had them before.  Hepatitis B shots (up to age 59), if you've never had them before.  COVID-19 shot: Get this shot when it's due.  Flu shot: Get a flu shot every year.  Tetanus shot: Get a tetanus shot every 10 years.  Pneumococcal, hepatitis A, and RSV shots: Ask your care team if you need these based on your risk.  Shingles shot (for age 50 and up)  General health tests  Diabetes screening:  Starting at age 35, Get screened for diabetes at least every 3 years.  If you are younger than age 35, ask your care team if you should be screened for diabetes.  Cholesterol test: At age 39, start having a cholesterol test every 5 years, or more often if advised.  Bone density scan (DEXA): At age 50, ask your care team if you should have this scan for osteoporosis (brittle bones).  Hepatitis C: Get tested at least once in your life.  STIs (sexually  transmitted infections)  Before age 24: Ask your care team if you should be screened for STIs.  After age 24: Get screened for STIs if you're at risk. You are at risk for STIs (including HIV) if:  You are sexually active with more than one person.  You don't use condoms every time.  You or a partner was diagnosed with a sexually transmitted infection.  If you are at risk for HIV, ask about PrEP medicine to prevent HIV.  Get tested for HIV at least once in your life, whether you are at risk for HIV or not.  Cancer screening tests  Cervical cancer screening: If you have a cervix, begin getting regular cervical cancer screening tests starting at age 21.  Breast cancer scan (mammogram): If you've ever had breasts, begin having regular mammograms starting at age 40. This is a scan to check for breast cancer.  Colon cancer screening: It is important to start screening for colon cancer at age 45.  Have a colonoscopy test every 10 years (or more often if you're at risk) Or, ask your provider about stool tests like a FIT test every year or Cologuard test every 3 years.  To learn more about your testing options, visit:   .  For help making a decision, visit:   https://bit.ly/iv89196.  Prostate cancer screening test: If you have a prostate, ask your care team if a prostate cancer screening test (PSA) at age 55 is right for you.  Lung cancer screening: If you are a current or former smoker ages 50 to 80, ask your care team if ongoing lung cancer screenings are right for you.  For informational purposes only. Not to replace the advice of your health care provider. Copyright   2023 [employer]. All rights reserved. Clinically reviewed by the [facility] Transitions Program. [employer] [id number] - REV 01/24.

## 2025-02-06 NOTE — NURSING NOTE
Prior to immunization administration, verified patients identity using patient s name and date of birth. Please see Immunization Activity for additional information.     Screening Questionnaire for Adult Immunization    Are you sick today?   No   Do you have allergies to medications, food, a vaccine component or latex?   No   Have you ever had a serious reaction after receiving a vaccination?   No   Do you have a long-term health problem with heart, lung, kidney, or metabolic disease (e.g., diabetes), asthma, a blood disorder, no spleen, complement component deficiency, a cochlear implant, or a spinal fluid leak?  Are you on long-term aspirin therapy?   No   Do you have cancer, leukemia, HIV/AIDS, or any other immune system problem?   No   Do you have a parent, brother, or sister with an immune system problem?   No   In the past 3 months, have you taken medications that affect  your immune system, such as prednisone, other steroids, or anticancer drugs; drugs for the treatment of rheumatoid arthritis, Crohn s disease, or psoriasis; or have you had radiation treatments?   No   Have you had a seizure, or a brain or other nervous system problem?   No   During the past year, have you received a transfusion of blood or blood    products, or been given immune (gamma) globulin or antiviral drug?   No   For women: Are you pregnant or is there a chance you could become       pregnant during the next month?   No   Have you received any vaccinations in the past 4 weeks?   No     Immunization questionnaire answers were all negative.      Patient instructed to remain in clinic for 15 minutes afterwards, and to report any adverse reactions.     Screening performed by Natalie Loza MA on 2/6/2025 at 1:35 PM.

## 2025-02-10 ENCOUNTER — TELEPHONE (OUTPATIENT)
Dept: GASTROENTEROLOGY | Facility: CLINIC | Age: 50
End: 2025-02-10
Payer: COMMERCIAL

## 2025-02-10 NOTE — TELEPHONE ENCOUNTER
"Endoscopy Scheduling Screen    Have you had any respiratory illness or flu-like symptoms in the last 10 days?  No    What is your communication preference for Instructions and/or Bowel Prep?   MyChart    What insurance is in the chart?  Other:  [facility]    Ordering/Referring Provider: KRISTINA   (If ordering provider performs procedure, schedule with ordering provider unless otherwise instructed. )    BMI: Estimated body mass index is 24.01 kg/m  as calculated from the following:    Height as of 2/6/25: 1.588 m (5' 2.5\").    Weight as of 2/6/25: 60.5 kg (133 lb 6 oz).     Sedation Ordered  moderate sedation.   If patient BMI > 50 do not schedule in [facility].    If patient BMI > 45 do not schedule at [facility].    Are you taking methadone or Suboxone?  NO, No RN review required.    Have you been diagnosed and are being treated for severe PTSD or severe anxiety?  NO, No RN review required.    Are you taking any prescription medications for pain 3 or more times per week?   NO, No RN review required.    Do you have a history of malignant hyperthermia?  No    (Females) Are you currently pregnant?        Have you been diagnosed or told you have pulmonary hypertension?   No    Do you have an LVAD?  No    Have you been told you have moderate to severe sleep apnea?  No.    Have you been told you have COPD, asthma, or any other lung disease?  No    Do you have any heart conditions?  No     Have you ever had or are you waiting for an organ transplant?  No. Continue scheduling, no site restrictions.    Have you had a stroke or transient ischemic attack (TIA aka \"mini stroke\" in the last 6 months?   No    Have you been diagnosed with or been told you have cirrhosis of the liver?   No.    Are you currently on dialysis?   No    Do you need assistance transferring?   No    BMI: Estimated body mass index is 24.01 kg/m  as calculated from the following:    Height as of 2/6/25: 1.588 m (5' 2.5\").    Weight as of 2/6/25: 60.5 kg (133 lb 6 oz).     Is " patients BMI > 40 and scheduling location [facility]?  No    Do you take an injectable or oral medication for weight loss or diabetes (excluding insulin)?  No    Do you take the medication Naltrexone?  No    Do you take blood thinners?  No       Prep   Are you currently on dialysis or do you have chronic kidney disease?  No    Do you have a diagnosis of diabetes?  No    Do you have a diagnosis of cystic fibrosis (CF)?  No    On a regular basis do you go 3 -5 days between bowel movements?  No    BMI > 40?  No    Preferred Pharmacy:    [id number] - [place], [place] - [address] AT [facility] [address]  [address]  [facility] [address]  Phone: [phone] Fax: [phone]      Final Scheduling Details     Procedure scheduled  Colonoscopy    Surgeon:  Sonia     Date of procedure:  5/22/25     Pre-OP / PAC:   No - Not required for this site.    Location  [facility] - Patient preference.    Sedation   Moderate Sedation - Per order.      Patient Reminders:   You will receive a call from a Nurse to review instructions and health history.  This assessment must be completed prior to your procedure.  Failure to complete the Nurse assessment may result in the procedure being cancelled.      On the day of your procedure, please designate an adult(s) who can drive you home stay with you for the next 24 hours. The medicines used in the exam will make you sleepy. You will not be able to drive.      You cannot take public transportation, ride share services, or non-medical taxi service without a responsible caregiver.  Medical transport services are allowed with the requirement that a responsible caregiver will receive you at your destination.  We require that drivers and caregivers are confirmed prior to your procedure.

## 2025-02-20 ENCOUNTER — LAB (OUTPATIENT)
Dept: LAB | Facility: CLINIC | Age: 50
End: 2025-02-20
Payer: COMMERCIAL

## 2025-02-20 DIAGNOSIS — Z13.1 DIABETES MELLITUS SCREENING: ICD-10-CM

## 2025-02-20 DIAGNOSIS — Z13.6 CARDIOVASCULAR SCREENING; LDL GOAL LESS THAN 160: ICD-10-CM

## 2025-02-20 DIAGNOSIS — E04.2 MULTINODULAR GOITER: ICD-10-CM

## 2025-02-20 DIAGNOSIS — Z78.9 HEPATITIS B VIRUS SEROLOGIC STATUS UNKNOWN: ICD-10-CM

## 2025-02-20 DIAGNOSIS — Z13.21 ENCOUNTER FOR VITAMIN DEFICIENCY SCREENING: ICD-10-CM

## 2025-02-20 LAB
CHOLEST SERPL-MCNC: 166 MG/DL
FASTING STATUS PATIENT QL REPORTED: YES
FASTING STATUS PATIENT QL REPORTED: YES
GLUCOSE SERPL-MCNC: 99 MG/DL (ref 70–99)
HBV SURFACE AB SERPL IA-ACNC: <3.5 M[IU]/ML
HBV SURFACE AB SERPL IA-ACNC: NONREACTIVE M[IU]/ML
HDLC SERPL-MCNC: 50 MG/DL
LDLC SERPL CALC-MCNC: 99 MG/DL
NONHDLC SERPL-MCNC: 116 MG/DL
TRIGL SERPL-MCNC: 87 MG/DL
TSH SERPL DL<=0.005 MIU/L-ACNC: 0.86 UIU/ML (ref 0.3–4.2)
VIT D+METAB SERPL-MCNC: 20 NG/ML (ref 20–50)

## 2025-05-01 ENCOUNTER — TELEPHONE (OUTPATIENT)
Dept: GASTROENTEROLOGY | Facility: CLINIC | Age: 50
End: 2025-05-01

## 2025-05-01 NOTE — TELEPHONE ENCOUNTER
Bowel Prep Review:  Disclaimer: No call was made to the patient.     Standard Miralax bowel prep.    Recommended due to standard bowel prep.   Instructions were sent via [employer].       Maryjo Cherry LPN  Endoscopy Procedure Pre Assessment

## 2025-05-20 ENCOUNTER — TELEPHONE (OUTPATIENT)
Dept: GASTROENTEROLOGY | Facility: CLINIC | Age: 50
End: 2025-05-20
Payer: COMMERCIAL

## 2025-05-20 NOTE — TELEPHONE ENCOUNTER
Left voicemail of arrival time of 6:30 AM.     [employer] message sent with updated arrival time.

## 2025-05-27 ENCOUNTER — MYC MEDICAL ADVICE (OUTPATIENT)
Dept: FAMILY MEDICINE | Facility: CLINIC | Age: 50
End: 2025-05-27
Payer: COMMERCIAL

## 2025-05-28 ENCOUNTER — RESULTS FOLLOW-UP (OUTPATIENT)
Dept: GASTROENTEROLOGY | Facility: CLINIC | Age: 50
End: 2025-05-28

## 2025-05-28 ENCOUNTER — ALLIED HEALTH/NURSE VISIT (OUTPATIENT)
Dept: FAMILY MEDICINE | Facility: CLINIC | Age: 50
End: 2025-05-28
Payer: COMMERCIAL

## 2025-05-28 DIAGNOSIS — Z23 NEED FOR HEPATITIS B VACCINATION: Primary | ICD-10-CM

## 2025-05-28 PROCEDURE — 90746 HEPB VACCINE 3 DOSE ADULT IM: CPT

## 2025-05-28 PROCEDURE — 99207 PR NO CHARGE NURSE ONLY: CPT

## 2025-05-28 PROCEDURE — 90471 IMMUNIZATION ADMIN: CPT

## 2025-05-28 NOTE — PROGRESS NOTES
Prior to immunization administration, verified patients identity using patient s name and date of birth. Please see Immunization Activity for additional information.     Screening Questionnaire for Adult Immunization    Are you sick today?   No   Do you have allergies to medications, food, a vaccine component or latex?   No     Have you ever had a serious reaction after receiving a vaccination?   No   Do you have a long-term health problem with heart, lung, kidney, or metabolic disease (e.g., diabetes), asthma, a blood disorder, no spleen, complement component deficiency, a cochlear implant, or a spinal fluid leak?  Are you on long-term aspirin therapy?   No   Do you have cancer, leukemia, HIV/AIDS, or any other immune system problem?   No   Do you have a parent, brother, or sister with an immune system problem?   No   In the past 3 months, have you taken medications that affect  your immune system, such as prednisone, other steroids, or anticancer drugs; drugs for the treatment of rheumatoid arthritis, Crohn s disease, or psoriasis; or have you had radiation treatments?   No   Have you had a seizure, or a brain or other nervous system problem?   No   During the past year, have you received a transfusion of blood or blood    products, or been given immune (gamma) globulin or antiviral drug?   No   For women: Are you pregnant or is there a chance you could become       pregnant during the next month?   No   Have you received any vaccinations in the past 4 weeks?   No     Immunization questionnaire answers were all negative.    I have reviewed the following standing orders:   This patient is due and qualifies for the Hepatitis B vaccine.    Click here for Hepatitis B Standing Order    I have reviewed the vaccines inclusion and exclusion criteria; No concerns regarding eligibility.     Patient instructed to remain in clinic for 15 minutes afterwards, and to report any adverse reactions.     Screening performed by Lissett  REJI Lubin MA on 5/28/2025 at 12:06 PM.

## 2025-06-04 ENCOUNTER — ANCILLARY PROCEDURE (OUTPATIENT)
Dept: ULTRASOUND IMAGING | Facility: CLINIC | Age: 50
End: 2025-06-04
Attending: INTERNAL MEDICINE
Payer: COMMERCIAL

## 2025-06-04 DIAGNOSIS — N84.0 ENDOMETRIAL POLYP: ICD-10-CM

## 2025-06-04 PROCEDURE — 76856 US EXAM PELVIC COMPLETE: CPT | Performed by: RADIOLOGY

## 2025-06-04 PROCEDURE — 76830 TRANSVAGINAL US NON-OB: CPT | Performed by: RADIOLOGY

## 2025-06-05 PROBLEM — D12.6 COLON ADENOMA: Status: ACTIVE | Noted: 2025-06-05

## 2025-06-09 ENCOUNTER — RESULTS FOLLOW-UP (OUTPATIENT)
Dept: FAMILY MEDICINE | Facility: CLINIC | Age: 50
End: 2025-06-09

## 2025-07-14 ENCOUNTER — ALLIED HEALTH/NURSE VISIT (OUTPATIENT)
Dept: FAMILY MEDICINE | Facility: CLINIC | Age: 50
End: 2025-07-14
Payer: COMMERCIAL

## 2025-07-14 DIAGNOSIS — Z23 ENCOUNTER FOR IMMUNIZATION: Primary | ICD-10-CM

## 2025-07-14 PROCEDURE — 90471 IMMUNIZATION ADMIN: CPT

## 2025-07-14 PROCEDURE — 90746 HEPB VACCINE 3 DOSE ADULT IM: CPT

## 2025-07-14 PROCEDURE — 99207 PR NO CHARGE NURSE ONLY: CPT

## 2025-07-14 NOTE — PROGRESS NOTES
Prior to immunization administration, verified patients identity using patient s name and date of birth. Please see Immunization Activity for additional information.     Is the patient's temperature normal (100.5 or less)? Yes 98.4    Patient MEETS CRITERIA. PROCEED with vaccine administration.        7/14/2025   General Questionnaire    Do you have any questions for your care team about the vaccines you will be receiving today? none             7/14/2025   Hepatitis B   Have you had a serious reaction to a hepatitis B vaccine or to something in a hepatitis B vaccine, including yeast)? No   Are you getting kidney dialysis (either peritoneal or hemodialysis)? No   Do you have an allergy to latex? No         Patient MEETS CRITERIA. PROCEED with vaccine administration.        Patient instructed to remain in clinic for 15 minutes afterwards, and to report any adverse reactions.  Patient declined wait    Link to Ancillary Visit Immunization Standing Orders SmartSet     Screening performed by Rajwinder Triana on 7/14/2025 at 11:12 AM.

## 2025-08-18 ENCOUNTER — MYC MEDICAL ADVICE (OUTPATIENT)
Dept: FAMILY MEDICINE | Facility: CLINIC | Age: 50
End: 2025-08-18
Payer: COMMERCIAL